# Patient Record
Sex: FEMALE | Race: WHITE | NOT HISPANIC OR LATINO | URBAN - METROPOLITAN AREA
[De-identification: names, ages, dates, MRNs, and addresses within clinical notes are randomized per-mention and may not be internally consistent; named-entity substitution may affect disease eponyms.]

---

## 2020-07-25 ENCOUNTER — TELEPHONE ENCOUNTER (OUTPATIENT)
Dept: URBAN - METROPOLITAN AREA CLINIC 13 | Facility: CLINIC | Age: 65
End: 2020-07-25

## 2020-07-25 RX ORDER — POLYETHYLENE GLYCOL 3350, SODIUM CHLORIDE, SODIUM BICARBONATE AND POTASSIUM CHLORIDE WITH LEMON FLAVOR 420; 11.2; 5.72; 1.48 G/4L; G/4L; G/4L; G/4L
USE AS DIRECTED POWDER, FOR SOLUTION ORAL
Qty: 1 | Refills: 0 | OUTPATIENT
Start: 2014-12-09 | End: 2017-02-01

## 2020-07-25 RX ORDER — AMOXICILLIN AND CLAVULANATE POTASSIUM 875; 125 MG/1; MG/1
TAKE 1 TABLET EVERY 12 HOURS UNTIL GONE TABLET, FILM COATED ORAL
Qty: 20 | Refills: 0 | OUTPATIENT
Start: 2017-02-01 | End: 2017-07-10

## 2020-07-25 RX ORDER — ESOMEPRAZOLE MAGNESIUM 40 MG
TAKE 1 CAPSULE BY MOUTH ONCE DAILY CAPSULE,DELAYED RELEASE (ENTERIC COATED) ORAL
Qty: 90 | Refills: 3 | OUTPATIENT
End: 2017-07-22

## 2020-07-25 RX ORDER — MELOXICAM 15 MG/1
TAKE 1 TABLET DAILY TABLET ORAL
Refills: 0 | OUTPATIENT
End: 2015-02-13

## 2020-07-25 RX ORDER — ATENOLOL 25 MG/1
TAKE 1 TABLET DAILY TABLET ORAL
Refills: 0 | OUTPATIENT
End: 2017-02-01

## 2020-07-26 ENCOUNTER — TELEPHONE ENCOUNTER (OUTPATIENT)
Dept: URBAN - METROPOLITAN AREA CLINIC 13 | Facility: CLINIC | Age: 65
End: 2020-07-26

## 2020-07-26 RX ORDER — INSULIN LISPRO 100 [IU]/ML
USE AS DIRECTED INJECTION, SOLUTION INTRAVENOUS; SUBCUTANEOUS
Refills: 0 | Status: ACTIVE | COMMUNITY

## 2020-07-26 RX ORDER — NITAZOXANIDE 500 MG/1
TAKE 1 TABLET TWICE DAILY TABLET ORAL
Qty: 20 | Refills: 0 | Status: ACTIVE | COMMUNITY
Start: 2017-07-26

## 2020-07-26 RX ORDER — ESOMEPRAZOLE MAGNESIUM 40 MG/1
TAKE 1 CAPSULE ONCE DAILY CAPSULE, DELAYED RELEASE PELLETS ORAL
Refills: 0 | Status: ACTIVE | COMMUNITY
Start: 2017-07-10

## 2020-07-26 RX ORDER — FLUTICASONE PROPIONATE 50 UG/1
SPRAY, METERED NASAL
Qty: 16 | Refills: 0 | Status: ACTIVE | COMMUNITY
Start: 2013-11-19

## 2020-07-26 RX ORDER — ACETAMINOPHEN AND CODEINE PHOSPHATE 300; 30 MG/1; MG/1
TABLET ORAL
Qty: 45 | Refills: 0 | Status: ACTIVE | COMMUNITY
Start: 2013-12-26

## 2020-07-26 RX ORDER — METFORMIN HYDROCHLORIDE 750 MG/1
TAKE 1 TABLET TWICE DAILY TABLET, EXTENDED RELEASE ORAL
Refills: 0 | Status: ACTIVE | COMMUNITY

## 2020-07-26 RX ORDER — LORAZEPAM 0.5 MG/1
TAKE 1 TABLET 3 TIMES DAILY AS NEEDED TABLET ORAL
Refills: 0 | Status: ACTIVE | COMMUNITY

## 2020-07-26 RX ORDER — AZITHROMYCIN DIHYDRATE 250 MG/1
TABLET, FILM COATED ORAL
Qty: 6 | Refills: 0 | Status: ACTIVE | COMMUNITY
Start: 2014-03-12

## 2020-07-26 RX ORDER — CLINDAMYCIN HYDROCHLORIDE 300 MG/1
CAPSULE ORAL
Qty: 20 | Refills: 0 | Status: ACTIVE | COMMUNITY
Start: 2014-10-22

## 2020-07-26 RX ORDER — OLMESARTAN MEDOXOMIL-HYDROCHLOROTHIAZIDE 12.5; 4 MG/1; MG/1
TABLET, FILM COATED ORAL
Qty: 30 | Refills: 0 | Status: ACTIVE | COMMUNITY
Start: 2014-07-22

## 2020-07-26 RX ORDER — MELOXICAM 7.5 MG/1
TABLET ORAL
Qty: 30 | Refills: 0 | Status: ACTIVE | COMMUNITY
Start: 2014-11-13

## 2020-07-26 RX ORDER — POLYETHYLENE GLYCOL 3350 AND ELECTROLYTES WITH LEMON FLAVOR 236; 22.74; 6.74; 5.86; 2.97 G/4L; G/4L; G/4L; G/4L; G/4L
POWDER, FOR SOLUTION ORAL
Qty: 255 | Refills: 0 | Status: ACTIVE | COMMUNITY
Start: 2014-12-09

## 2020-07-26 RX ORDER — CLINDAMYCIN HYDROCHLORIDE 300 MG/1
CAPSULE ORAL
Qty: 40 | Refills: 0 | Status: ACTIVE | COMMUNITY
Start: 2013-12-27

## 2020-07-26 RX ORDER — ICOSAPENT ETHYL 1000 MG/1
TAKE 2 CAPSULE TWICE DAILY CAPSULE ORAL
Refills: 0 | Status: ACTIVE | COMMUNITY

## 2020-07-26 RX ORDER — FLUCONAZOLE 150 MG/1
TABLET ORAL
Qty: 2 | Refills: 0 | Status: ACTIVE | COMMUNITY
Start: 2014-11-01

## 2020-07-26 RX ORDER — INSULIN GLARGINE 100 [IU]/ML
INJECT SUBCUTANEOUSLY AS DIRECTED INJECTION, SOLUTION SUBCUTANEOUS
Refills: 0 | Status: ACTIVE | COMMUNITY

## 2020-07-26 RX ORDER — SERTRALINE HYDROCHLORIDE 112 UG/1
TAKE 1 TABLET DAILY AS DIRECTED TABLET ORAL
Refills: 0 | Status: ACTIVE | COMMUNITY

## 2020-07-26 RX ORDER — ACETAMINOPHEN 325 MG/1
TAKE 1 TO 2 TABLETS EVERY 4 HOURS AS NEEDED TABLET, FILM COATED ORAL
Refills: 0 | Status: ACTIVE | COMMUNITY

## 2020-07-26 RX ORDER — HYDROCODONE BITARTRATE AND ACETAMINOPHEN 5; 325 MG/1; MG/1
TABLET ORAL
Qty: 16 | Refills: 0 | Status: ACTIVE | COMMUNITY
Start: 2014-08-12

## 2020-07-26 RX ORDER — FLUCONAZOLE 150 MG/1
TABLET ORAL
Qty: 1 | Refills: 0 | Status: ACTIVE | COMMUNITY
Start: 2014-01-20

## 2020-07-26 RX ORDER — DICYCLOMINE HYDROCHLORIDE 10 MG/1
TAKE 1 CAPSULE EVERY 6 HOURS PRN ABDOMINAL PAIN CAPSULE ORAL
Qty: 40 | Refills: 1 | Status: ACTIVE | COMMUNITY
Start: 2017-09-20

## 2020-07-26 RX ORDER — AMOXICILLIN 500 MG/1
CAPSULE ORAL
Qty: 28 | Refills: 0 | Status: ACTIVE | COMMUNITY
Start: 2013-12-24

## 2020-07-26 RX ORDER — OLMESARTAN MEDOXOMIL 40 MG/1
TAKE 1 TABLET DAILY TABLET, FILM COATED ORAL
Refills: 0 | Status: ACTIVE | COMMUNITY

## 2020-07-26 RX ORDER — LEVOFLOXACIN 500 MG/1
TABLET, FILM COATED ORAL
Qty: 7 | Refills: 0 | Status: ACTIVE | COMMUNITY
Start: 2014-04-09

## 2020-07-26 RX ORDER — MOXIFLOXACIN HYDROCHLORIDE TABLETS, 400 MG 400 MG/1
TAKE 1 TABLET DAILY TABLET, FILM COATED ORAL
Qty: 14 | Refills: 0 | Status: ACTIVE | COMMUNITY
Start: 2017-07-26

## 2020-07-26 RX ORDER — CIPROFLOXACIN HYDROCHLORIDE 500 MG/1
TABLET, FILM COATED ORAL
Qty: 20 | Refills: 0 | Status: ACTIVE | COMMUNITY
Start: 2014-11-01

## 2020-07-26 RX ORDER — INSULIN GLARGINE 100 [IU]/ML
INJECTION, SOLUTION SUBCUTANEOUS
Qty: 15 | Refills: 0 | Status: ACTIVE | COMMUNITY
Start: 2014-02-04

## 2020-07-26 RX ORDER — SULFAMETHOXAZOLE AND TRIMETHOPRIM 800; 160 MG/1; MG/1
TAKE 1 TABLET TWICE DAILY TABLET ORAL
Qty: 20 | Refills: 0 | Status: ACTIVE | COMMUNITY
Start: 2017-09-20

## 2020-07-26 RX ORDER — AMOXICILLIN AND CLAVULANATE POTASSIUM 875; 125 MG/1; MG/1
TAKE 1 TABLET TWICE DAILY FOR 10 DAYS TABLET, FILM COATED ORAL
Qty: 20 | Refills: 0 | Status: ACTIVE | COMMUNITY
Start: 2017-07-22

## 2020-07-26 RX ORDER — TRAMADOL HYDROCHLORIDE 50 MG/1
TAKE 1 TABLET DAILY PRN TABLET ORAL
Refills: 0 | Status: ACTIVE | COMMUNITY

## 2020-07-26 RX ORDER — ATENOLOL 50 MG/1
TAKE 1 TABLET TWICE DAILY TABLET ORAL
Refills: 0 | Status: ACTIVE | COMMUNITY

## 2020-07-26 RX ORDER — OXYCODONE AND ACETAMINOPHEN 5; 325 MG/1; MG/1
TABLET ORAL
Qty: 10 | Refills: 0 | Status: ACTIVE | COMMUNITY
Start: 2013-12-27

## 2020-07-26 RX ORDER — TRAMADOL HYDROCHLORIDE 50 MG/1
TABLET ORAL
Qty: 60 | Refills: 0 | Status: ACTIVE | COMMUNITY
Start: 2014-08-21

## 2020-07-26 RX ORDER — METRONIDAZOLE 500 MG/1
TABLET ORAL
Qty: 56 | Refills: 0 | Status: ACTIVE | COMMUNITY
Start: 2014-11-02

## 2021-06-10 ENCOUNTER — OFFICE VISIT (OUTPATIENT)
Dept: URBAN - METROPOLITAN AREA CLINIC 107 | Facility: CLINIC | Age: 66
End: 2021-06-10
Payer: MEDICARE

## 2021-06-10 ENCOUNTER — WEB ENCOUNTER (OUTPATIENT)
Dept: URBAN - METROPOLITAN AREA CLINIC 107 | Facility: CLINIC | Age: 66
End: 2021-06-10

## 2021-06-10 VITALS
TEMPERATURE: 98.2 F | HEIGHT: 63 IN | HEART RATE: 74 BPM | SYSTOLIC BLOOD PRESSURE: 157 MMHG | DIASTOLIC BLOOD PRESSURE: 86 MMHG | RESPIRATION RATE: 22 BRPM | WEIGHT: 230 LBS | BODY MASS INDEX: 40.75 KG/M2

## 2021-06-10 DIAGNOSIS — Z87.19 HISTORY OF DIVERTICULITIS OF COLON: ICD-10-CM

## 2021-06-10 DIAGNOSIS — Z12.11 COLON CANCER SCREENING: ICD-10-CM

## 2021-06-10 DIAGNOSIS — K21.9 GASTROESOPHAGEAL REFLUX DISEASE WITHOUT ESOPHAGITIS: ICD-10-CM

## 2021-06-10 PROCEDURE — 99203 OFFICE O/P NEW LOW 30 MIN: CPT | Performed by: INTERNAL MEDICINE

## 2021-06-10 RX ORDER — SERTRALINE HYDROCHLORIDE 112 UG/1
TAKE 1 TABLET DAILY AS DIRECTED TABLET ORAL
Refills: 0 | Status: ACTIVE | COMMUNITY

## 2021-06-10 RX ORDER — INSULIN GLARGINE 100 [IU]/ML
INJECTION, SOLUTION SUBCUTANEOUS
Qty: 15 | Refills: 0 | Status: ON HOLD | COMMUNITY
Start: 2014-02-04

## 2021-06-10 RX ORDER — ICOSAPENT ETHYL 1000 MG/1
TAKE 2 CAPSULE TWICE DAILY CAPSULE ORAL
Refills: 0 | Status: ON HOLD | COMMUNITY

## 2021-06-10 RX ORDER — ONDANSETRON 4 MG/1
1 TABLET ON THE TONGUE AND ALLOW TO DISSOLVE TABLET, ORALLY DISINTEGRATING ORAL ONCE A DAY
Status: ACTIVE | COMMUNITY

## 2021-06-10 RX ORDER — NITAZOXANIDE 500 MG/1
TAKE 1 TABLET TWICE DAILY TABLET ORAL
Qty: 20 | Refills: 0 | Status: ON HOLD | COMMUNITY
Start: 2017-07-26

## 2021-06-10 RX ORDER — OLMESARTAN MEDOXOMIL-HYDROCHLOROTHIAZIDE 12.5; 4 MG/1; MG/1
TABLET, FILM COATED ORAL
Qty: 30 | Refills: 0 | Status: ON HOLD | COMMUNITY
Start: 2014-07-22

## 2021-06-10 RX ORDER — HYDROCHLOROTHIAZIDE 12.5 MG/1
1 CAPSULE IN THE MORNING CAPSULE ORAL ONCE A DAY
Status: ACTIVE | COMMUNITY

## 2021-06-10 RX ORDER — LORAZEPAM 0.5 MG/1
TAKE 1 TABLET 3 TIMES DAILY AS NEEDED TABLET ORAL
Refills: 0 | Status: ACTIVE | COMMUNITY

## 2021-06-10 RX ORDER — TRAMADOL HYDROCHLORIDE 50 MG/1
TAKE 1 TABLET DAILY PRN TABLET ORAL
Refills: 0 | Status: ON HOLD | COMMUNITY

## 2021-06-10 RX ORDER — INSULIN LISPRO 100 [IU]/ML
USE AS DIRECTED INJECTION, SOLUTION INTRAVENOUS; SUBCUTANEOUS
Refills: 0 | Status: ACTIVE | COMMUNITY

## 2021-06-10 RX ORDER — GLUCAGON HYDROCHLORIDE 1 MG/ML
AS DIRECTED INJECTION, POWDER, FOR SOLUTION INTRAMUSCULAR; INTRAVENOUS; SUBCUTANEOUS
Status: ACTIVE | COMMUNITY

## 2021-06-10 RX ORDER — ESOMEPRAZOLE MAGNESIUM 40 MG/1
TAKE 1 CAPSULE ONCE DAILY CAPSULE, DELAYED RELEASE PELLETS ORAL
Refills: 0 | Status: ACTIVE | COMMUNITY
Start: 2017-07-10

## 2021-06-10 RX ORDER — NITROGLYCERIN 0.4 MG/1
AS DIRECTED TABLET SUBLINGUAL
Status: ACTIVE | COMMUNITY

## 2021-06-10 RX ORDER — FLUTICASONE PROPIONATE 50 UG/1
SPRAY, METERED NASAL
Qty: 16 | Refills: 0 | Status: ON HOLD | COMMUNITY
Start: 2013-11-19

## 2021-06-10 RX ORDER — CLOPIDOGREL BISULFATE 75 MG/1
1 TABLET TABLET ORAL ONCE A DAY
Status: ACTIVE | COMMUNITY

## 2021-06-10 RX ORDER — OLMESARTAN MEDOXOMIL 40 MG/1
TAKE 1 TABLET DAILY TABLET, FILM COATED ORAL
Refills: 0 | Status: ACTIVE | COMMUNITY

## 2021-06-10 RX ORDER — CIPROFLOXACIN HYDROCHLORIDE 500 MG/1
TABLET, FILM COATED ORAL
Qty: 20 | Refills: 0 | Status: ON HOLD | COMMUNITY
Start: 2014-11-01

## 2021-06-10 RX ORDER — ACETAMINOPHEN 325 MG/1
TAKE 1 TO 2 TABLETS EVERY 4 HOURS AS NEEDED TABLET, FILM COATED ORAL
Refills: 0 | Status: ON HOLD | COMMUNITY

## 2021-06-10 RX ORDER — POLYETHYLENE GLYCOL 3350 AND ELECTROLYTES WITH LEMON FLAVOR 236; 22.74; 6.74; 5.86; 2.97 G/4L; G/4L; G/4L; G/4L; G/4L
POWDER, FOR SOLUTION ORAL
Qty: 255 | Refills: 0 | Status: ON HOLD | COMMUNITY
Start: 2014-12-09

## 2021-06-10 RX ORDER — HYDROCODONE BITARTRATE AND ACETAMINOPHEN 5; 325 MG/1; MG/1
TABLET ORAL
Qty: 16 | Refills: 0 | Status: ON HOLD | COMMUNITY
Start: 2014-08-12

## 2021-06-10 RX ORDER — OXYCODONE AND ACETAMINOPHEN 5; 325 MG/1; MG/1
TABLET ORAL
Qty: 10 | Refills: 0 | Status: ON HOLD | COMMUNITY
Start: 2013-12-27

## 2021-06-10 RX ORDER — MELOXICAM 7.5 MG/1
TABLET ORAL
Qty: 30 | Refills: 0 | Status: ON HOLD | COMMUNITY
Start: 2014-11-13

## 2021-06-10 RX ORDER — INSULIN GLARGINE 100 [IU]/ML
INJECT SUBCUTANEOUSLY AS DIRECTED INJECTION, SOLUTION SUBCUTANEOUS
Refills: 0 | Status: ON HOLD | COMMUNITY

## 2021-06-10 RX ORDER — AMOXICILLIN 500 MG/1
CAPSULE ORAL
Qty: 28 | Refills: 0 | Status: ON HOLD | COMMUNITY
Start: 2013-12-24

## 2021-06-10 RX ORDER — ATENOLOL 50 MG/1
TAKE 1 TABLET TWICE DAILY TABLET ORAL
Refills: 0 | Status: ACTIVE | COMMUNITY

## 2021-06-10 RX ORDER — AMOXICILLIN AND CLAVULANATE POTASSIUM 875; 125 MG/1; MG/1
TAKE 1 TABLET TWICE DAILY FOR 10 DAYS TABLET, FILM COATED ORAL
Qty: 20 | Refills: 0 | Status: ON HOLD | COMMUNITY
Start: 2017-07-22

## 2021-06-10 RX ORDER — METFORMIN HYDROCHLORIDE 750 MG/1
TAKE 1 TABLET TWICE DAILY TABLET, EXTENDED RELEASE ORAL
Refills: 0 | Status: ON HOLD | COMMUNITY

## 2021-06-10 RX ORDER — LEVOFLOXACIN 500 MG/1
TABLET, FILM COATED ORAL
Qty: 7 | Refills: 0 | Status: ON HOLD | COMMUNITY
Start: 2014-04-09

## 2021-06-10 RX ORDER — SULFAMETHOXAZOLE AND TRIMETHOPRIM 800; 160 MG/1; MG/1
TAKE 1 TABLET TWICE DAILY TABLET ORAL
Qty: 20 | Refills: 0 | Status: ON HOLD | COMMUNITY
Start: 2017-09-20

## 2021-06-10 RX ORDER — ACETAMINOPHEN AND CODEINE PHOSPHATE 300; 30 MG/1; MG/1
TABLET ORAL
Qty: 45 | Refills: 0 | Status: ON HOLD | COMMUNITY
Start: 2013-12-26

## 2021-06-10 RX ORDER — ALBUTEROL SULFATE 90 UG/1
1 PUFF AS NEEDED AEROSOL, METERED RESPIRATORY (INHALATION)
Status: ACTIVE | COMMUNITY

## 2021-06-10 RX ORDER — FLUCONAZOLE 150 MG/1
TABLET ORAL
Qty: 1 | Refills: 0 | Status: ON HOLD | COMMUNITY
Start: 2014-01-20

## 2021-06-10 RX ORDER — METHOCARBAMOL 500 MG/1
1.5 TABLETS TABLET ORAL
Status: ACTIVE | COMMUNITY

## 2021-06-10 RX ORDER — MOXIFLOXACIN HYDROCHLORIDE TABLETS, 400 MG 400 MG/1
TAKE 1 TABLET DAILY TABLET, FILM COATED ORAL
Qty: 14 | Refills: 0 | Status: ON HOLD | COMMUNITY
Start: 2017-07-26

## 2021-06-10 RX ORDER — ESOMEPRAZOLE MAGNESIUM 40 MG/1
TAKE 1 CAPSULE ONCE DAILY CAPSULE, DELAYED RELEASE PELLETS ORAL
OUTPATIENT
Start: 2017-07-10

## 2021-06-10 RX ORDER — METRONIDAZOLE 500 MG/1
TABLET ORAL
Qty: 56 | Refills: 0 | Status: ON HOLD | COMMUNITY
Start: 2014-11-02

## 2021-06-10 RX ORDER — AZITHROMYCIN DIHYDRATE 250 MG/1
TABLET, FILM COATED ORAL
Qty: 6 | Refills: 0 | Status: ON HOLD | COMMUNITY
Start: 2014-03-12

## 2021-06-10 RX ORDER — ROSUVASTATIN CALCIUM 5 MG/1
1 TABLET TABLET, FILM COATED ORAL ONCE A DAY
Status: ACTIVE | COMMUNITY

## 2021-06-10 RX ORDER — CLINDAMYCIN HYDROCHLORIDE 300 MG/1
CAPSULE ORAL
Qty: 40 | Refills: 0 | Status: ON HOLD | COMMUNITY
Start: 2013-12-27

## 2021-06-10 RX ORDER — DICYCLOMINE HYDROCHLORIDE 10 MG/1
TAKE 1 CAPSULE EVERY 6 HOURS PRN ABDOMINAL PAIN CAPSULE ORAL
Qty: 40 | Refills: 1 | Status: ON HOLD | COMMUNITY
Start: 2017-09-20

## 2021-06-10 NOTE — HPI-TODAY'S VISIT:
Ms Velazquez is a 65-year-old woman with a history of type 2 diabetes, obesity, recurrent diverticulitis status post sigmoid colon resection (2017) presenting for routine follow-up. She was last seen in 2017 regarding recurrent bouts of sigmoid colon diverticulitis.  She underwent laparoscopic sigmoid colon resection by Dr. Saavedra on 11/6/2017. She typically has a bowel movement after drinking coffee in the morning.  She reports the stool consistency is like "mush".  She has some increased gas/bloating.  She takes a probiotic without much improvement.  She has experienced 2 episodes of abdominal pain since her colon resection, but otherwise no abdominal pain.  She denies any blood per rectum. She denies any heartburn taking esomeprazole 40 mg daily.  No dysphagia.

## 2021-09-02 ENCOUNTER — WEB ENCOUNTER (OUTPATIENT)
Dept: URBAN - METROPOLITAN AREA CLINIC 107 | Facility: CLINIC | Age: 66
End: 2021-09-02

## 2021-09-02 ENCOUNTER — OFFICE VISIT (OUTPATIENT)
Dept: URBAN - METROPOLITAN AREA CLINIC 107 | Facility: CLINIC | Age: 66
End: 2021-09-02
Payer: MEDICARE

## 2021-09-02 ENCOUNTER — LAB OUTSIDE AN ENCOUNTER (OUTPATIENT)
Dept: URBAN - METROPOLITAN AREA CLINIC 107 | Facility: CLINIC | Age: 66
End: 2021-09-02

## 2021-09-02 VITALS
HEART RATE: 81 BPM | SYSTOLIC BLOOD PRESSURE: 134 MMHG | DIASTOLIC BLOOD PRESSURE: 70 MMHG | WEIGHT: 232 LBS | HEIGHT: 63 IN | TEMPERATURE: 98.8 F | BODY MASS INDEX: 41.11 KG/M2

## 2021-09-02 DIAGNOSIS — K21.9 GASTROESOPHAGEAL REFLUX DISEASE WITHOUT ESOPHAGITIS: ICD-10-CM

## 2021-09-02 DIAGNOSIS — R13.14 PHARYNGOESOPHAGEAL DYSPHAGIA: ICD-10-CM

## 2021-09-02 DIAGNOSIS — K62.5 BRBPR (BRIGHT RED BLOOD PER RECTUM): ICD-10-CM

## 2021-09-02 PROBLEM — 118361000119100: Status: ACTIVE | Noted: 2021-06-10

## 2021-09-02 PROCEDURE — 99214 OFFICE O/P EST MOD 30 MIN: CPT | Performed by: INTERNAL MEDICINE

## 2021-09-02 RX ORDER — FLUCONAZOLE 150 MG/1
TABLET ORAL
Qty: 1 | Refills: 0 | Status: ON HOLD | COMMUNITY
Start: 2014-01-20

## 2021-09-02 RX ORDER — INSULIN LISPRO 100 [IU]/ML
USE AS DIRECTED INJECTION, SOLUTION INTRAVENOUS; SUBCUTANEOUS
Refills: 0 | Status: ON HOLD | COMMUNITY

## 2021-09-02 RX ORDER — TRAMADOL HYDROCHLORIDE 50 MG/1
TAKE 1 TABLET DAILY PRN TABLET ORAL
Refills: 0 | Status: ON HOLD | COMMUNITY

## 2021-09-02 RX ORDER — CLINDAMYCIN HYDROCHLORIDE 300 MG/1
CAPSULE ORAL
Qty: 40 | Refills: 0 | Status: ON HOLD | COMMUNITY
Start: 2013-12-27

## 2021-09-02 RX ORDER — LEVOFLOXACIN 500 MG/1
TABLET, FILM COATED ORAL
Qty: 7 | Refills: 0 | Status: ON HOLD | COMMUNITY
Start: 2014-04-09

## 2021-09-02 RX ORDER — METHOCARBAMOL 500 MG/1
1.5 TABLETS TABLET ORAL
Status: ACTIVE | COMMUNITY

## 2021-09-02 RX ORDER — INSULIN HUMAN 500 [IU]/ML
AS DIRECTED INJECTION, SOLUTION SUBCUTANEOUS
Status: ACTIVE | COMMUNITY

## 2021-09-02 RX ORDER — LORAZEPAM 0.5 MG/1
TAKE 1 TABLET 3 TIMES DAILY AS NEEDED TABLET ORAL
Refills: 0 | Status: ACTIVE | COMMUNITY

## 2021-09-02 RX ORDER — OLMESARTAN MEDOXOMIL 40 MG/1
TAKE 1 TABLET DAILY TABLET, FILM COATED ORAL
Refills: 0 | Status: ACTIVE | COMMUNITY

## 2021-09-02 RX ORDER — MELOXICAM 7.5 MG/1
TABLET ORAL
Qty: 30 | Refills: 0 | Status: ON HOLD | COMMUNITY
Start: 2014-11-13

## 2021-09-02 RX ORDER — ESOMEPRAZOLE MAGNESIUM 40 MG/1
TAKE 1 CAPSULE ONCE DAILY CAPSULE, DELAYED RELEASE PELLETS ORAL
Status: ACTIVE | COMMUNITY
Start: 2017-07-10

## 2021-09-02 RX ORDER — OXYCODONE AND ACETAMINOPHEN 5; 325 MG/1; MG/1
TABLET ORAL
Qty: 10 | Refills: 0 | Status: ON HOLD | COMMUNITY
Start: 2013-12-27

## 2021-09-02 RX ORDER — GLUCAGON HYDROCHLORIDE 1 MG/ML
AS DIRECTED INJECTION, POWDER, FOR SOLUTION INTRAMUSCULAR; INTRAVENOUS; SUBCUTANEOUS
Status: ACTIVE | COMMUNITY

## 2021-09-02 RX ORDER — ALBUTEROL SULFATE 90 UG/1
1 PUFF AS NEEDED AEROSOL, METERED RESPIRATORY (INHALATION)
Status: ACTIVE | COMMUNITY

## 2021-09-02 RX ORDER — METFORMIN HYDROCHLORIDE 750 MG/1
TAKE 1 TABLET TWICE DAILY TABLET, EXTENDED RELEASE ORAL
Refills: 0 | Status: ON HOLD | COMMUNITY

## 2021-09-02 RX ORDER — ROSUVASTATIN CALCIUM 5 MG/1
1 TABLET TABLET, FILM COATED ORAL ONCE A DAY
Status: ACTIVE | COMMUNITY

## 2021-09-02 RX ORDER — SERTRALINE HYDROCHLORIDE 112 UG/1
TAKE 1 TABLET DAILY AS DIRECTED TABLET ORAL
Refills: 0 | Status: ACTIVE | COMMUNITY

## 2021-09-02 RX ORDER — HYDROCODONE BITARTRATE AND ACETAMINOPHEN 5; 325 MG/1; MG/1
TABLET ORAL
Qty: 16 | Refills: 0 | Status: ON HOLD | COMMUNITY
Start: 2014-08-12

## 2021-09-02 RX ORDER — HYDROCHLOROTHIAZIDE 12.5 MG/1
1 CAPSULE IN THE MORNING CAPSULE ORAL ONCE A DAY
Status: ACTIVE | COMMUNITY

## 2021-09-02 RX ORDER — NITAZOXANIDE 500 MG/1
TAKE 1 TABLET TWICE DAILY TABLET ORAL
Qty: 20 | Refills: 0 | Status: ON HOLD | COMMUNITY
Start: 2017-07-26

## 2021-09-02 RX ORDER — ONDANSETRON 4 MG/1
1 TABLET ON THE TONGUE AND ALLOW TO DISSOLVE TABLET, ORALLY DISINTEGRATING ORAL ONCE A DAY
Status: ACTIVE | COMMUNITY

## 2021-09-02 RX ORDER — AMOXICILLIN 500 MG/1
CAPSULE ORAL
Qty: 28 | Refills: 0 | Status: ON HOLD | COMMUNITY
Start: 2013-12-24

## 2021-09-02 RX ORDER — INSULIN GLARGINE 100 [IU]/ML
INJECTION, SOLUTION SUBCUTANEOUS
Qty: 15 | Refills: 0 | Status: ON HOLD | COMMUNITY
Start: 2014-02-04

## 2021-09-02 RX ORDER — OLMESARTAN MEDOXOMIL-HYDROCHLOROTHIAZIDE 12.5; 4 MG/1; MG/1
TABLET, FILM COATED ORAL
Qty: 30 | Refills: 0 | Status: ON HOLD | COMMUNITY
Start: 2014-07-22

## 2021-09-02 RX ORDER — NITROGLYCERIN 0.4 MG/1
AS DIRECTED TABLET SUBLINGUAL
Status: ACTIVE | COMMUNITY

## 2021-09-02 RX ORDER — METRONIDAZOLE 500 MG/1
TABLET ORAL
Qty: 56 | Refills: 0 | Status: ON HOLD | COMMUNITY
Start: 2014-11-02

## 2021-09-02 RX ORDER — ACETAMINOPHEN AND CODEINE PHOSPHATE 300; 30 MG/1; MG/1
TABLET ORAL
Qty: 45 | Refills: 0 | Status: ON HOLD | COMMUNITY
Start: 2013-12-26

## 2021-09-02 RX ORDER — ICOSAPENT ETHYL 1000 MG/1
TAKE 2 CAPSULE TWICE DAILY CAPSULE ORAL
Refills: 0 | Status: ON HOLD | COMMUNITY

## 2021-09-02 RX ORDER — MOXIFLOXACIN HYDROCHLORIDE TABLETS, 400 MG 400 MG/1
TAKE 1 TABLET DAILY TABLET, FILM COATED ORAL
Qty: 14 | Refills: 0 | Status: ON HOLD | COMMUNITY
Start: 2017-07-26

## 2021-09-02 RX ORDER — CLOPIDOGREL BISULFATE 75 MG/1
1 TABLET TABLET ORAL ONCE A DAY
Status: ACTIVE | COMMUNITY

## 2021-09-02 RX ORDER — DICYCLOMINE HYDROCHLORIDE 10 MG/1
TAKE 1 CAPSULE EVERY 6 HOURS PRN ABDOMINAL PAIN CAPSULE ORAL
Qty: 40 | Refills: 1 | Status: ON HOLD | COMMUNITY
Start: 2017-09-20

## 2021-09-02 RX ORDER — CIPROFLOXACIN HYDROCHLORIDE 500 MG/1
TABLET, FILM COATED ORAL
Qty: 20 | Refills: 0 | Status: ON HOLD | COMMUNITY
Start: 2014-11-01

## 2021-09-02 RX ORDER — HYDROXYCHLOROQUINE SULFATE 200 MG/1
TAKE ONE TABLET TABLET, FILM COATED ORAL TWICE DAILY
Status: ACTIVE | COMMUNITY

## 2021-09-02 RX ORDER — INSULIN GLARGINE 100 [IU]/ML
INJECT SUBCUTANEOUSLY AS DIRECTED INJECTION, SOLUTION SUBCUTANEOUS
Refills: 0 | Status: ON HOLD | COMMUNITY

## 2021-09-02 RX ORDER — ACETAMINOPHEN 325 MG/1
TAKE 1 TO 2 TABLETS EVERY 4 HOURS AS NEEDED TABLET, FILM COATED ORAL
Refills: 0 | Status: ON HOLD | COMMUNITY

## 2021-09-02 RX ORDER — AMOXICILLIN AND CLAVULANATE POTASSIUM 875; 125 MG/1; MG/1
TAKE 1 TABLET TWICE DAILY FOR 10 DAYS TABLET, FILM COATED ORAL
Qty: 20 | Refills: 0 | Status: ON HOLD | COMMUNITY
Start: 2017-07-22

## 2021-09-02 RX ORDER — SULFAMETHOXAZOLE AND TRIMETHOPRIM 800; 160 MG/1; MG/1
TAKE 1 TABLET TWICE DAILY TABLET ORAL
Qty: 20 | Refills: 0 | Status: ON HOLD | COMMUNITY
Start: 2017-09-20

## 2021-09-02 RX ORDER — POLYETHYLENE GLYCOL 3350 AND ELECTROLYTES WITH LEMON FLAVOR 236; 22.74; 6.74; 5.86; 2.97 G/4L; G/4L; G/4L; G/4L; G/4L
POWDER, FOR SOLUTION ORAL
Qty: 255 | Refills: 0 | Status: ON HOLD | COMMUNITY
Start: 2014-12-09

## 2021-09-02 RX ORDER — AZITHROMYCIN DIHYDRATE 250 MG/1
TABLET, FILM COATED ORAL
Qty: 6 | Refills: 0 | Status: ON HOLD | COMMUNITY
Start: 2014-03-12

## 2021-09-02 RX ORDER — ATENOLOL 50 MG/1
TAKE 1 TABLET TWICE DAILY TABLET ORAL
Refills: 0 | Status: ACTIVE | COMMUNITY

## 2021-09-02 RX ORDER — FLUTICASONE PROPIONATE 50 UG/1
SPRAY, METERED NASAL
Qty: 16 | Refills: 0 | Status: ON HOLD | COMMUNITY
Start: 2013-11-19

## 2021-09-02 NOTE — HPI-TODAY'S VISIT:
Ms. Velazquez is a 66-year-old woman with a history of diabetes mellitus type 2, obesity, recurrent diverticulitis status post sigmoid colon resection (2017), presenting with multiple complaints of acid reflux, dysphagia and red blood per rectum. She was last seen in this office on 6/10/2021 and was doing relatively well at that time.  Her GERD symptoms were well controlled on esomeprazole 40 mg once daily.  She was to trial Benefiber 1 tablespoon twice daily in an effort to improve stool consistency.  She reports a single episode of bright red blood per rectum after straining to have a bowel movement last week.  This is since resolved.  She attributes this to hemorrhoidal irritation.  Otherwise, she states that she drinks a cup of coffee in the morning to have 1-2 regular bowel movements daily.  She did not trial Benefiber.  She denies any pain with defecation.  Her main complaint today is a new problem of intermittent dysphagia with liquids.  She describes a sensation of liquids becoming hung at the level of her thyroid and at the level of her xiphoid process.  She reports associated regurgitation and a nonproductive cough.  She has occasional nighttime awakenings.  There is no odynophagia. Denies nay difficulty swallowing solid foods or pills.  She does report a history of chronic shortness of breath.  She has not seen a pulmonologist for this recently.  She states that she was placed on Plavix sometime last year by . SHe was being evaluated by cardiology for "heart issues", but work up was postponed secondary to the pandemic.

## 2021-09-17 ENCOUNTER — TELEPHONE ENCOUNTER (OUTPATIENT)
Dept: URBAN - METROPOLITAN AREA CLINIC 113 | Facility: CLINIC | Age: 66
End: 2021-09-17

## 2021-09-27 ENCOUNTER — OFFICE VISIT (OUTPATIENT)
Dept: URBAN - METROPOLITAN AREA SURGERY CENTER 25 | Facility: SURGERY CENTER | Age: 66
End: 2021-09-27

## 2021-10-07 ENCOUNTER — OFFICE VISIT (OUTPATIENT)
Dept: URBAN - METROPOLITAN AREA CLINIC 107 | Facility: CLINIC | Age: 66
End: 2021-10-07

## 2021-11-02 ENCOUNTER — OFFICE VISIT (OUTPATIENT)
Dept: URBAN - METROPOLITAN AREA CLINIC 113 | Facility: CLINIC | Age: 66
End: 2021-11-02

## 2021-12-01 ENCOUNTER — CLAIMS CREATED FROM THE CLAIM WINDOW (OUTPATIENT)
Dept: URBAN - METROPOLITAN AREA CLINIC 4 | Facility: CLINIC | Age: 66
End: 2021-12-01
Payer: MEDICARE

## 2021-12-01 ENCOUNTER — OFFICE VISIT (OUTPATIENT)
Dept: URBAN - METROPOLITAN AREA SURGERY CENTER 25 | Facility: SURGERY CENTER | Age: 66
End: 2021-12-01
Payer: MEDICARE

## 2021-12-01 DIAGNOSIS — K31.89 GASTRIC FOVEOLAR HYPERPLASIA: ICD-10-CM

## 2021-12-01 DIAGNOSIS — K21.9 GASTROESOPHAGEAL REFLUX DISEASE: ICD-10-CM

## 2021-12-01 DIAGNOSIS — K31.7 POLYP OF STOMACH FUNDIC GLAND: ICD-10-CM

## 2021-12-01 DIAGNOSIS — R13.10 DYSPHAGIA: ICD-10-CM

## 2021-12-01 DIAGNOSIS — K31.7 POLYP OF STOMACH AND DUODENUM: ICD-10-CM

## 2021-12-01 DIAGNOSIS — K31.89 DEFORMED PYLORUS, ACQUIRED: ICD-10-CM

## 2021-12-01 PROCEDURE — 88312 SPECIAL STAINS GROUP 1: CPT | Performed by: PATHOLOGY

## 2021-12-01 PROCEDURE — G8907 PT DOC NO EVENTS ON DISCHARG: HCPCS | Performed by: INTERNAL MEDICINE

## 2021-12-01 PROCEDURE — 88305 TISSUE EXAM BY PATHOLOGIST: CPT | Performed by: PATHOLOGY

## 2021-12-01 PROCEDURE — 43239 EGD BIOPSY SINGLE/MULTIPLE: CPT | Performed by: INTERNAL MEDICINE

## 2021-12-01 PROCEDURE — 43248 EGD GUIDE WIRE INSERTION: CPT | Performed by: INTERNAL MEDICINE

## 2021-12-01 RX ORDER — OXYCODONE AND ACETAMINOPHEN 5; 325 MG/1; MG/1
TABLET ORAL
Qty: 10 | Refills: 0 | Status: ON HOLD | COMMUNITY
Start: 2013-12-27

## 2021-12-01 RX ORDER — SULFAMETHOXAZOLE AND TRIMETHOPRIM 800; 160 MG/1; MG/1
TAKE 1 TABLET TWICE DAILY TABLET ORAL
Qty: 20 | Refills: 0 | Status: ON HOLD | COMMUNITY
Start: 2017-09-20

## 2021-12-01 RX ORDER — ROSUVASTATIN CALCIUM 5 MG/1
1 TABLET TABLET, FILM COATED ORAL ONCE A DAY
Status: ACTIVE | COMMUNITY

## 2021-12-01 RX ORDER — AMOXICILLIN 500 MG/1
CAPSULE ORAL
Qty: 28 | Refills: 0 | Status: ON HOLD | COMMUNITY
Start: 2013-12-24

## 2021-12-01 RX ORDER — METFORMIN HYDROCHLORIDE 750 MG/1
TAKE 1 TABLET TWICE DAILY TABLET, EXTENDED RELEASE ORAL
Refills: 0 | Status: ON HOLD | COMMUNITY

## 2021-12-01 RX ORDER — MELOXICAM 7.5 MG/1
TABLET ORAL
Qty: 30 | Refills: 0 | Status: ON HOLD | COMMUNITY
Start: 2014-11-13

## 2021-12-01 RX ORDER — INSULIN GLARGINE 100 [IU]/ML
INJECT SUBCUTANEOUSLY AS DIRECTED INJECTION, SOLUTION SUBCUTANEOUS
Refills: 0 | Status: ON HOLD | COMMUNITY

## 2021-12-01 RX ORDER — INSULIN HUMAN 500 [IU]/ML
AS DIRECTED INJECTION, SOLUTION SUBCUTANEOUS
Status: ACTIVE | COMMUNITY

## 2021-12-01 RX ORDER — SERTRALINE HYDROCHLORIDE 112 UG/1
TAKE 1 TABLET DAILY AS DIRECTED TABLET ORAL
Refills: 0 | Status: ACTIVE | COMMUNITY

## 2021-12-01 RX ORDER — METHOCARBAMOL 500 MG/1
1.5 TABLETS TABLET ORAL
Status: ACTIVE | COMMUNITY

## 2021-12-01 RX ORDER — METRONIDAZOLE 500 MG/1
TABLET ORAL
Qty: 56 | Refills: 0 | Status: ON HOLD | COMMUNITY
Start: 2014-11-02

## 2021-12-01 RX ORDER — LEVOFLOXACIN 500 MG/1
TABLET, FILM COATED ORAL
Qty: 7 | Refills: 0 | Status: ON HOLD | COMMUNITY
Start: 2014-04-09

## 2021-12-01 RX ORDER — ESOMEPRAZOLE MAGNESIUM 40 MG/1
TAKE 1 CAPSULE ONCE DAILY CAPSULE, DELAYED RELEASE PELLETS ORAL
Status: ACTIVE | COMMUNITY
Start: 2017-07-10

## 2021-12-01 RX ORDER — AMOXICILLIN AND CLAVULANATE POTASSIUM 875; 125 MG/1; MG/1
TAKE 1 TABLET TWICE DAILY FOR 10 DAYS TABLET, FILM COATED ORAL
Qty: 20 | Refills: 0 | Status: ON HOLD | COMMUNITY
Start: 2017-07-22

## 2021-12-01 RX ORDER — FLUTICASONE PROPIONATE 50 UG/1
SPRAY, METERED NASAL
Qty: 16 | Refills: 0 | Status: ON HOLD | COMMUNITY
Start: 2013-11-19

## 2021-12-01 RX ORDER — AZITHROMYCIN DIHYDRATE 250 MG/1
TABLET, FILM COATED ORAL
Qty: 6 | Refills: 0 | Status: ON HOLD | COMMUNITY
Start: 2014-03-12

## 2021-12-01 RX ORDER — INSULIN LISPRO 100 [IU]/ML
USE AS DIRECTED INJECTION, SOLUTION INTRAVENOUS; SUBCUTANEOUS
Refills: 0 | Status: ON HOLD | COMMUNITY

## 2021-12-01 RX ORDER — INSULIN GLARGINE 100 [IU]/ML
INJECTION, SOLUTION SUBCUTANEOUS
Qty: 15 | Refills: 0 | Status: ON HOLD | COMMUNITY
Start: 2014-02-04

## 2021-12-01 RX ORDER — LORAZEPAM 0.5 MG/1
TAKE 1 TABLET 3 TIMES DAILY AS NEEDED TABLET ORAL
Refills: 0 | Status: ACTIVE | COMMUNITY

## 2021-12-01 RX ORDER — NITROGLYCERIN 0.4 MG/1
AS DIRECTED TABLET SUBLINGUAL
Status: ACTIVE | COMMUNITY

## 2021-12-01 RX ORDER — HYDROXYCHLOROQUINE SULFATE 200 MG/1
TAKE ONE TABLET TABLET, FILM COATED ORAL TWICE DAILY
Status: ACTIVE | COMMUNITY

## 2021-12-01 RX ORDER — CLINDAMYCIN HYDROCHLORIDE 300 MG/1
CAPSULE ORAL
Qty: 40 | Refills: 0 | Status: ON HOLD | COMMUNITY
Start: 2013-12-27

## 2021-12-01 RX ORDER — OLMESARTAN MEDOXOMIL 40 MG/1
TAKE 1 TABLET DAILY TABLET, FILM COATED ORAL
Refills: 0 | Status: ACTIVE | COMMUNITY

## 2021-12-01 RX ORDER — HYDROCHLOROTHIAZIDE 12.5 MG/1
1 CAPSULE IN THE MORNING CAPSULE ORAL ONCE A DAY
Status: ACTIVE | COMMUNITY

## 2021-12-01 RX ORDER — CLOPIDOGREL BISULFATE 75 MG/1
1 TABLET TABLET ORAL ONCE A DAY
Status: ACTIVE | COMMUNITY

## 2021-12-01 RX ORDER — ALBUTEROL SULFATE 90 UG/1
1 PUFF AS NEEDED AEROSOL, METERED RESPIRATORY (INHALATION)
Status: ACTIVE | COMMUNITY

## 2021-12-01 RX ORDER — ACETAMINOPHEN 325 MG/1
TAKE 1 TO 2 TABLETS EVERY 4 HOURS AS NEEDED TABLET, FILM COATED ORAL
Refills: 0 | Status: ON HOLD | COMMUNITY

## 2021-12-01 RX ORDER — DICYCLOMINE HYDROCHLORIDE 10 MG/1
TAKE 1 CAPSULE EVERY 6 HOURS PRN ABDOMINAL PAIN CAPSULE ORAL
Qty: 40 | Refills: 1 | Status: ON HOLD | COMMUNITY
Start: 2017-09-20

## 2021-12-01 RX ORDER — ICOSAPENT ETHYL 1000 MG/1
TAKE 2 CAPSULE TWICE DAILY CAPSULE ORAL
Refills: 0 | Status: ON HOLD | COMMUNITY

## 2021-12-01 RX ORDER — ONDANSETRON 4 MG/1
1 TABLET ON THE TONGUE AND ALLOW TO DISSOLVE TABLET, ORALLY DISINTEGRATING ORAL ONCE A DAY
Status: ACTIVE | COMMUNITY

## 2021-12-01 RX ORDER — POLYETHYLENE GLYCOL 3350 AND ELECTROLYTES WITH LEMON FLAVOR 236; 22.74; 6.74; 5.86; 2.97 G/4L; G/4L; G/4L; G/4L; G/4L
POWDER, FOR SOLUTION ORAL
Qty: 255 | Refills: 0 | Status: ON HOLD | COMMUNITY
Start: 2014-12-09

## 2021-12-01 RX ORDER — ATENOLOL 50 MG/1
TAKE 1 TABLET TWICE DAILY TABLET ORAL
Refills: 0 | Status: ACTIVE | COMMUNITY

## 2021-12-01 RX ORDER — MOXIFLOXACIN HYDROCHLORIDE TABLETS, 400 MG 400 MG/1
TAKE 1 TABLET DAILY TABLET, FILM COATED ORAL
Qty: 14 | Refills: 0 | Status: ON HOLD | COMMUNITY
Start: 2017-07-26

## 2021-12-01 RX ORDER — NITAZOXANIDE 500 MG/1
TAKE 1 TABLET TWICE DAILY TABLET ORAL
Qty: 20 | Refills: 0 | Status: ON HOLD | COMMUNITY
Start: 2017-07-26

## 2021-12-01 RX ORDER — CIPROFLOXACIN HYDROCHLORIDE 500 MG/1
TABLET, FILM COATED ORAL
Qty: 20 | Refills: 0 | Status: ON HOLD | COMMUNITY
Start: 2014-11-01

## 2021-12-01 RX ORDER — OLMESARTAN MEDOXOMIL-HYDROCHLOROTHIAZIDE 12.5; 4 MG/1; MG/1
TABLET, FILM COATED ORAL
Qty: 30 | Refills: 0 | Status: ON HOLD | COMMUNITY
Start: 2014-07-22

## 2021-12-01 RX ORDER — HYDROCODONE BITARTRATE AND ACETAMINOPHEN 5; 325 MG/1; MG/1
TABLET ORAL
Qty: 16 | Refills: 0 | Status: ON HOLD | COMMUNITY
Start: 2014-08-12

## 2021-12-01 RX ORDER — TRAMADOL HYDROCHLORIDE 50 MG/1
TAKE 1 TABLET DAILY PRN TABLET ORAL
Refills: 0 | Status: ON HOLD | COMMUNITY

## 2021-12-01 RX ORDER — ACETAMINOPHEN AND CODEINE PHOSPHATE 300; 30 MG/1; MG/1
TABLET ORAL
Qty: 45 | Refills: 0 | Status: ON HOLD | COMMUNITY
Start: 2013-12-26

## 2021-12-01 RX ORDER — FLUCONAZOLE 150 MG/1
TABLET ORAL
Qty: 1 | Refills: 0 | Status: ON HOLD | COMMUNITY
Start: 2014-01-20

## 2021-12-01 RX ORDER — GLUCAGON HYDROCHLORIDE 1 MG/ML
AS DIRECTED INJECTION, POWDER, FOR SOLUTION INTRAMUSCULAR; INTRAVENOUS; SUBCUTANEOUS
Status: ACTIVE | COMMUNITY

## 2022-01-21 ENCOUNTER — OFFICE VISIT (OUTPATIENT)
Dept: URBAN - METROPOLITAN AREA CLINIC 113 | Facility: CLINIC | Age: 67
End: 2022-01-21

## 2022-02-07 ENCOUNTER — OFFICE VISIT (OUTPATIENT)
Dept: URBAN - METROPOLITAN AREA CLINIC 113 | Facility: CLINIC | Age: 67
End: 2022-02-07
Payer: MEDICARE

## 2022-02-07 ENCOUNTER — WEB ENCOUNTER (OUTPATIENT)
Dept: URBAN - METROPOLITAN AREA CLINIC 113 | Facility: CLINIC | Age: 67
End: 2022-02-07

## 2022-02-07 VITALS
WEIGHT: 237 LBS | HEIGHT: 63 IN | TEMPERATURE: 98.1 F | SYSTOLIC BLOOD PRESSURE: 168 MMHG | BODY MASS INDEX: 41.99 KG/M2 | HEART RATE: 67 BPM | DIASTOLIC BLOOD PRESSURE: 84 MMHG | RESPIRATION RATE: 18 BRPM

## 2022-02-07 DIAGNOSIS — K62.5 BRBPR (BRIGHT RED BLOOD PER RECTUM): ICD-10-CM

## 2022-02-07 DIAGNOSIS — K21.9 GASTROESOPHAGEAL REFLUX DISEASE WITHOUT ESOPHAGITIS: ICD-10-CM

## 2022-02-07 DIAGNOSIS — R79.89 ELEVATED LFTS: ICD-10-CM

## 2022-02-07 DIAGNOSIS — R13.14 PHARYNGOESOPHAGEAL DYSPHAGIA: ICD-10-CM

## 2022-02-07 DIAGNOSIS — Z12.11 COLON CANCER SCREENING: ICD-10-CM

## 2022-02-07 PROCEDURE — 99214 OFFICE O/P EST MOD 30 MIN: CPT | Performed by: PHYSICIAN ASSISTANT

## 2022-02-07 RX ORDER — METRONIDAZOLE 500 MG/1
TABLET ORAL
Qty: 56 | Refills: 0 | Status: DISCONTINUED | COMMUNITY
Start: 2014-11-02

## 2022-02-07 RX ORDER — DICYCLOMINE HYDROCHLORIDE 10 MG/1
TAKE 1 CAPSULE EVERY 6 HOURS PRN ABDOMINAL PAIN CAPSULE ORAL
Qty: 40 | Refills: 1 | Status: DISCONTINUED | COMMUNITY
Start: 2017-09-20

## 2022-02-07 RX ORDER — ALBUTEROL SULFATE 90 UG/1
1 PUFF AS NEEDED AEROSOL, METERED RESPIRATORY (INHALATION)
Status: ACTIVE | COMMUNITY

## 2022-02-07 RX ORDER — INSULIN GLARGINE 100 [IU]/ML
INJECT SUBCUTANEOUSLY AS DIRECTED INJECTION, SOLUTION SUBCUTANEOUS
Refills: 0 | Status: DISCONTINUED | COMMUNITY

## 2022-02-07 RX ORDER — MOXIFLOXACIN HYDROCHLORIDE TABLETS, 400 MG 400 MG/1
TAKE 1 TABLET DAILY TABLET, FILM COATED ORAL
Qty: 14 | Refills: 0 | Status: DISCONTINUED | COMMUNITY
Start: 2017-07-26

## 2022-02-07 RX ORDER — CLOPIDOGREL BISULFATE 75 MG/1
1 TABLET TABLET ORAL ONCE A DAY
Status: ACTIVE | COMMUNITY

## 2022-02-07 RX ORDER — LORAZEPAM 0.5 MG/1
TAKE 1 TABLET 3 TIMES DAILY AS NEEDED TABLET ORAL
Refills: 0 | Status: ACTIVE | COMMUNITY

## 2022-02-07 RX ORDER — ICOSAPENT ETHYL 1000 MG/1
TAKE 2 CAPSULE TWICE DAILY CAPSULE ORAL
Refills: 0 | Status: DISCONTINUED | COMMUNITY

## 2022-02-07 RX ORDER — NITAZOXANIDE 500 MG/1
TAKE 1 TABLET TWICE DAILY TABLET ORAL
Qty: 20 | Refills: 0 | Status: DISCONTINUED | COMMUNITY
Start: 2017-07-26

## 2022-02-07 RX ORDER — NITROGLYCERIN 0.4 MG/1
AS DIRECTED TABLET SUBLINGUAL
Status: ACTIVE | COMMUNITY

## 2022-02-07 RX ORDER — ATENOLOL 50 MG/1
TAKE 1 TABLET TWICE DAILY TABLET ORAL
Refills: 0 | Status: ACTIVE | COMMUNITY

## 2022-02-07 RX ORDER — AMOXICILLIN 500 MG/1
CAPSULE ORAL
Qty: 28 | Refills: 0 | Status: DISCONTINUED | COMMUNITY
Start: 2013-12-24

## 2022-02-07 RX ORDER — GLUCAGON HYDROCHLORIDE 1 MG/ML
AS DIRECTED INJECTION, POWDER, FOR SOLUTION INTRAMUSCULAR; INTRAVENOUS; SUBCUTANEOUS
Status: ACTIVE | COMMUNITY

## 2022-02-07 RX ORDER — SERTRALINE HYDROCHLORIDE 112 UG/1
TAKE 1 TABLET DAILY AS DIRECTED TABLET ORAL
Refills: 0 | Status: ACTIVE | COMMUNITY

## 2022-02-07 RX ORDER — HYDROCHLOROTHIAZIDE 12.5 MG/1
1 CAPSULE IN THE MORNING CAPSULE ORAL ONCE A DAY
Status: ACTIVE | COMMUNITY

## 2022-02-07 RX ORDER — AZITHROMYCIN DIHYDRATE 250 MG/1
TABLET, FILM COATED ORAL
Qty: 6 | Refills: 0 | Status: DISCONTINUED | COMMUNITY
Start: 2014-03-12

## 2022-02-07 RX ORDER — SULFAMETHOXAZOLE AND TRIMETHOPRIM 800; 160 MG/1; MG/1
TAKE 1 TABLET TWICE DAILY TABLET ORAL
Qty: 20 | Refills: 0 | Status: DISCONTINUED | COMMUNITY
Start: 2017-09-20

## 2022-02-07 RX ORDER — METHOCARBAMOL 500 MG/1
1.5 TABLETS TABLET ORAL
Status: DISCONTINUED | COMMUNITY

## 2022-02-07 RX ORDER — ACETAMINOPHEN 325 MG/1
TAKE 1 TO 2 TABLETS EVERY 4 HOURS AS NEEDED TABLET, FILM COATED ORAL
Refills: 0 | Status: DISCONTINUED | COMMUNITY

## 2022-02-07 RX ORDER — CIPROFLOXACIN HYDROCHLORIDE 500 MG/1
TABLET, FILM COATED ORAL
Qty: 20 | Refills: 0 | Status: DISCONTINUED | COMMUNITY
Start: 2014-11-01

## 2022-02-07 RX ORDER — ROSUVASTATIN CALCIUM 5 MG/1
1 TABLET TABLET, FILM COATED ORAL ONCE A DAY
Status: ACTIVE | COMMUNITY

## 2022-02-07 RX ORDER — HYDROCODONE BITARTRATE AND ACETAMINOPHEN 5; 325 MG/1; MG/1
TABLET ORAL
Qty: 16 | Refills: 0 | Status: DISCONTINUED | COMMUNITY
Start: 2014-08-12

## 2022-02-07 RX ORDER — ONDANSETRON 4 MG/1
1 TABLET ON THE TONGUE AND ALLOW TO DISSOLVE TABLET, ORALLY DISINTEGRATING ORAL ONCE A DAY
Status: ACTIVE | COMMUNITY

## 2022-02-07 RX ORDER — INSULIN LISPRO 100 [IU]/ML
USE AS DIRECTED INJECTION, SOLUTION INTRAVENOUS; SUBCUTANEOUS
Refills: 0 | Status: DISCONTINUED | COMMUNITY

## 2022-02-07 RX ORDER — MELOXICAM 7.5 MG/1
TABLET ORAL
Qty: 30 | Refills: 0 | Status: DISCONTINUED | COMMUNITY
Start: 2014-11-13

## 2022-02-07 RX ORDER — METFORMIN HYDROCHLORIDE 750 MG/1
TAKE 1 TABLET TWICE DAILY TABLET, EXTENDED RELEASE ORAL
Refills: 0 | Status: DISCONTINUED | COMMUNITY

## 2022-02-07 RX ORDER — ACETAMINOPHEN AND CODEINE PHOSPHATE 300; 30 MG/1; MG/1
TABLET ORAL
Qty: 45 | Refills: 0 | Status: DISCONTINUED | COMMUNITY
Start: 2013-12-26

## 2022-02-07 RX ORDER — OLMESARTAN MEDOXOMIL 40 MG/1
TAKE 1 TABLET DAILY TABLET, FILM COATED ORAL
Refills: 0 | Status: ACTIVE | COMMUNITY

## 2022-02-07 RX ORDER — OXYCODONE AND ACETAMINOPHEN 5; 325 MG/1; MG/1
TABLET ORAL
Qty: 10 | Refills: 0 | Status: DISCONTINUED | COMMUNITY
Start: 2013-12-27

## 2022-02-07 RX ORDER — TRAMADOL HYDROCHLORIDE 50 MG/1
TAKE 1 TABLET DAILY PRN TABLET ORAL
Refills: 0 | Status: DISCONTINUED | COMMUNITY

## 2022-02-07 RX ORDER — INSULIN GLARGINE 100 [IU]/ML
INJECTION, SOLUTION SUBCUTANEOUS
Qty: 15 | Refills: 0 | Status: DISCONTINUED | COMMUNITY
Start: 2014-02-04

## 2022-02-07 RX ORDER — INSULIN HUMAN 500 [IU]/ML
AS DIRECTED INJECTION, SOLUTION SUBCUTANEOUS
Status: ACTIVE | COMMUNITY

## 2022-02-07 RX ORDER — OLMESARTAN MEDOXOMIL-HYDROCHLOROTHIAZIDE 12.5; 4 MG/1; MG/1
TABLET, FILM COATED ORAL
Qty: 30 | Refills: 0 | Status: DISCONTINUED | COMMUNITY
Start: 2014-07-22

## 2022-02-07 RX ORDER — HYDROXYCHLOROQUINE SULFATE 200 MG/1
TAKE ONE TABLET TABLET, FILM COATED ORAL TWICE DAILY
Status: ACTIVE | COMMUNITY

## 2022-02-07 RX ORDER — FLUTICASONE PROPIONATE 50 UG/1
SPRAY, METERED NASAL
Qty: 16 | Refills: 0 | Status: DISCONTINUED | COMMUNITY
Start: 2013-11-19

## 2022-02-07 RX ORDER — LEVOFLOXACIN 500 MG/1
TABLET, FILM COATED ORAL
Qty: 7 | Refills: 0 | Status: DISCONTINUED | COMMUNITY
Start: 2014-04-09

## 2022-02-07 RX ORDER — ESOMEPRAZOLE MAGNESIUM 40 MG/1
TAKE 1 CAPSULE ONCE DAILY CAPSULE, DELAYED RELEASE PELLETS ORAL
Status: ACTIVE | COMMUNITY
Start: 2017-07-10

## 2022-02-07 RX ORDER — POLYETHYLENE GLYCOL 3350 AND ELECTROLYTES WITH LEMON FLAVOR 236; 22.74; 6.74; 5.86; 2.97 G/4L; G/4L; G/4L; G/4L; G/4L
POWDER, FOR SOLUTION ORAL
Qty: 255 | Refills: 0 | Status: DISCONTINUED | COMMUNITY
Start: 2014-12-09

## 2022-02-07 RX ORDER — CLINDAMYCIN HYDROCHLORIDE 300 MG/1
CAPSULE ORAL
Qty: 40 | Refills: 0 | Status: DISCONTINUED | COMMUNITY
Start: 2013-12-27

## 2022-02-07 RX ORDER — AMOXICILLIN AND CLAVULANATE POTASSIUM 875; 125 MG/1; MG/1
TAKE 1 TABLET TWICE DAILY FOR 10 DAYS TABLET, FILM COATED ORAL
Qty: 20 | Refills: 0 | Status: DISCONTINUED | COMMUNITY
Start: 2017-07-22

## 2022-02-07 RX ORDER — FLUCONAZOLE 150 MG/1
TABLET ORAL
Qty: 1 | Refills: 0 | Status: DISCONTINUED | COMMUNITY
Start: 2014-01-20

## 2022-02-07 NOTE — HPI-TODAY'S VISIT:
Ms. Velazquez is a 66-year-old woman with a history of diabetes mellitus type 2, obesity, recurrent diverticulitis status post sigmoid resection (2017), presenting for follow-up after undergoing EGD. She was last seen on 9/2/2021 with multiple GI complaints including acid reflux, dysphagia and red blood per rectum.  She was recommended to undergo barium swallow, however patient preferred to proceed with endoscopic evaluation at that time.  EGD with dilation was ordered to assess for significant gastroesophageal pathology and for hopeful symptomatic relief in her dysphagia.  Given her chronic shortness of breath of unknown etiology, clearance was obtained from her cardiologist.  She was to continue Plavix prior to procedure.  Her red blood per rectum most likely related to benign anorectal disease such as hemorrhoids.  Her symptoms were noted to have resolved at the time of follow-up.  She was to continue using Tucks pads as needed for hemorrhoidal irritation.  She was instructed to retrial Benefiber 1 to 2 tablespoons daily for hopeful improvements in bowel consistency.  Her GERD symptoms were relatively well controlled on Nexium daily.  She was instructed to use Pepcid 20 mg at bedtime to maximize acid suppression. EGD with dilation (12/1/2021):No endoscopic esophageal abnormality to explain patient's dysphagia.  Empirically dilated with 18 mm Savary dilator.  Nonerosive gastropathy.  Multiple gastric polyps.  Suspect fundic gland polyps.  Normal duodenum.  Stomach antrum and body biopsies revealed proton pump inhibitor effect consistent with fundic gland polyps.  Gastric biopsies revealed polypoid foveolar hyperplasia.  Fundic gland polyp.  No evidence of H. pylori or intestinal metaplasia, dysplasia or malignancy.   Her interval history is notable for COVID-19 infection in January. She did not require hospitalization, supplemental oxygen therapy or ventilatory support at that time. She continues to report occasional episodes of difficulty swallowing, typically occurring with liquids. She denies any regurgitation or odynophagia. No solid food dysphagia. She denies any nausea, vomiting or nocturnal acid reflux symptoms. She has occasional mild generalized abdominal pain. She is not bothered by this stating that her pain is typically fleeting. She is having fairly regular bowel movements every other day. If she consumes dairy products, she reports diarrhea. No red blood per rectum, melena or nocturnal diarrhea. She denies any pain with defecation. Her weight remains stable. Her most recent laboratory testing available to us was performed in August 2021. This revealed mildly elevated ALT at 44 and AST of 62. Her bilirubin was normal at 0.2. She also had a normal alkaline phosphatase at 88. She tells me that she is due for labs with her primary care provider later this month and will have these forwarded to our office. She recently had an abdominal ultrasound by Dr. Reynolds which was reportedly normal. Her database is incomplete as we do not have this record for review. She tells me that her blood sugars are running higher than normal despite adhering to strict insulin regimen. She has plans to see an endocrinologist within the near future. She

## 2022-06-21 ENCOUNTER — OFFICE VISIT (OUTPATIENT)
Dept: URBAN - METROPOLITAN AREA CLINIC 113 | Facility: CLINIC | Age: 67
End: 2022-06-21
Payer: MEDICARE

## 2022-06-21 VITALS
HEIGHT: 63 IN | HEART RATE: 62 BPM | TEMPERATURE: 97.3 F | SYSTOLIC BLOOD PRESSURE: 163 MMHG | RESPIRATION RATE: 18 BRPM | BODY MASS INDEX: 42.88 KG/M2 | DIASTOLIC BLOOD PRESSURE: 87 MMHG | WEIGHT: 242 LBS

## 2022-06-21 DIAGNOSIS — K21.9 GASTROESOPHAGEAL REFLUX DISEASE WITHOUT ESOPHAGITIS: ICD-10-CM

## 2022-06-21 DIAGNOSIS — Z12.11 COLON CANCER SCREENING: ICD-10-CM

## 2022-06-21 DIAGNOSIS — R13.14 PHARYNGOESOPHAGEAL DYSPHAGIA: ICD-10-CM

## 2022-06-21 DIAGNOSIS — R79.89 ELEVATED LFTS: ICD-10-CM

## 2022-06-21 DIAGNOSIS — K62.5 BRBPR (BRIGHT RED BLOOD PER RECTUM): ICD-10-CM

## 2022-06-21 PROBLEM — 305058001: Status: ACTIVE | Noted: 2021-06-10

## 2022-06-21 PROBLEM — 74474003: Status: ACTIVE | Noted: 2021-09-02

## 2022-06-21 PROBLEM — 40739000: Status: ACTIVE | Noted: 2021-09-02

## 2022-06-21 PROCEDURE — 99213 OFFICE O/P EST LOW 20 MIN: CPT

## 2022-06-21 RX ORDER — SERTRALINE HYDROCHLORIDE 112 UG/1
TAKE 1 TABLET DAILY AS DIRECTED TABLET ORAL
Refills: 0 | Status: ACTIVE | COMMUNITY

## 2022-06-21 RX ORDER — ONDANSETRON 4 MG/1
1 TABLET ON THE TONGUE AND ALLOW TO DISSOLVE TABLET, ORALLY DISINTEGRATING ORAL ONCE A DAY
Status: ACTIVE | COMMUNITY

## 2022-06-21 RX ORDER — ALBUTEROL SULFATE 90 UG/1
1 PUFF AS NEEDED AEROSOL, METERED RESPIRATORY (INHALATION)
Status: ACTIVE | COMMUNITY

## 2022-06-21 RX ORDER — ESOMEPRAZOLE MAGNESIUM 40 MG/1
TAKE 1 CAPSULE ONCE DAILY CAPSULE, DELAYED RELEASE PELLETS ORAL
Status: ACTIVE | COMMUNITY
Start: 2017-07-10

## 2022-06-21 RX ORDER — OLMESARTAN MEDOXOMIL 40 MG/1
TAKE 1 TABLET DAILY TABLET, FILM COATED ORAL
Refills: 0 | Status: ACTIVE | COMMUNITY

## 2022-06-21 RX ORDER — HYDROCHLOROTHIAZIDE 12.5 MG/1
1 CAPSULE IN THE MORNING CAPSULE ORAL ONCE A DAY
Status: ON HOLD | COMMUNITY

## 2022-06-21 RX ORDER — ATENOLOL 50 MG/1
TAKE 1 TABLET TWICE DAILY TABLET ORAL
Refills: 0 | Status: ACTIVE | COMMUNITY

## 2022-06-21 RX ORDER — ROSUVASTATIN CALCIUM 5 MG/1
1 TABLET TABLET, FILM COATED ORAL ONCE A DAY
Status: ACTIVE | COMMUNITY

## 2022-06-21 RX ORDER — HYDROXYCHLOROQUINE SULFATE 200 MG/1
TAKE ONE TABLET TABLET, FILM COATED ORAL TWICE DAILY
Status: ACTIVE | COMMUNITY

## 2022-06-21 RX ORDER — INSULIN HUMAN 500 [IU]/ML
AS DIRECTED INJECTION, SOLUTION SUBCUTANEOUS
Status: ACTIVE | COMMUNITY

## 2022-06-21 RX ORDER — INSULIN DEGLUDEC INJECTION 100 U/ML
20 UTS INJECTION, SOLUTION SUBCUTANEOUS
Status: ACTIVE | COMMUNITY

## 2022-06-21 RX ORDER — LORAZEPAM 0.5 MG/1
TAKE 1 TABLET 3 TIMES DAILY AS NEEDED TABLET ORAL AS NEEDED
Refills: 0 | Status: ACTIVE | COMMUNITY

## 2022-06-21 RX ORDER — GLUCAGON HYDROCHLORIDE 1 MG/ML
AS DIRECTED INJECTION, POWDER, FOR SOLUTION INTRAMUSCULAR; INTRAVENOUS; SUBCUTANEOUS
Status: ACTIVE | COMMUNITY

## 2022-06-21 RX ORDER — CLOPIDOGREL BISULFATE 75 MG/1
1 TABLET TABLET ORAL ONCE A DAY
Status: ACTIVE | COMMUNITY

## 2022-06-21 RX ORDER — NITROGLYCERIN 0.4 MG/1
AS DIRECTED TABLET SUBLINGUAL
Status: ACTIVE | COMMUNITY

## 2022-06-21 NOTE — HPI-OTHER HISTORIES
EGD with dilation (12/1/2021):No endoscopic esophageal abnormality to explain patient's dysphagia.  Empirically dilated with 18 mm Savary dilator.  Nonerosive gastropathy.  Multiple gastric polyps.  Suspect fundic gland polyps.  Normal duodenum.  Stomach antrum and body biopsies revealed proton pump inhibitor effect consistent with fundic gland polyps.  Gastric biopsies revealed polypoid foveolar hyperplasia.  Fundic gland polyp.  No evidence of H. pylori or intestinal metaplasia, dysplasia or malignancy.

## 2022-06-21 NOTE — HPI-TODAY'S VISIT:
Ms. Velazquez is a 66-year-old woman with a history of diabetes mellitus type 2, obesity, recurrent diverticulitis status post sigmoid resection (2017), presenting for follow-up after undergoing EGD. She was last seen on 9/2/2021 with multiple GI complaints including acid reflux, dysphagia and red blood per rectum.  She was recommended to undergo barium swallow, however patient preferred to proceed with endoscopic evaluation at that time.  EGD with dilation was ordered to assess for significant gastroesophageal pathology and for hopeful symptomatic relief in her dysphagia.  Given her chronic shortness of breath of unknown etiology, clearance was obtained from her cardiologist.  She was to continue Plavix prior to procedure.  Her red blood per rectum most likely related to benign anorectal disease such as hemorrhoids.  Her symptoms were noted to have resolved at the time of follow-up.  She was to continue using Tucks pads as needed for hemorrhoidal irritation.  She was instructed to retrial Benefiber 1 to 2 tablespoons daily for hopeful improvements in bowel consistency.  Her GERD symptoms were relatively well controlled on Nexium daily.  She was instructed to use Pepcid 20 mg at bedtime to maximize acid suppression.  Her interval history is notable for COVID-19 infection in January. She did not require hospitalization, supplemental oxygen therapy or ventilatory support at that time. She continues to report occasional episodes of difficulty swallowing, typically occurring with liquids. She denies any regurgitation or odynophagia. No solid food dysphagia. She denies any nausea, vomiting or nocturnal acid reflux symptoms. She has occasional mild generalized abdominal pain. She is not bothered by this stating that her pain is typically fleeting. She is having fairly regular bowel movements every other day. If she consumes dairy products, she reports diarrhea. No red blood per rectum, melena or nocturnal diarrhea. She denies any pain with defecation. Her weight remains stable. Her most recent laboratory testing available to us was performed in August 2021. This revealed mildly elevated ALT at 44 and AST of 62. Her bilirubin was normal at 0.2. She also had a normal alkaline phosphatase at 88. She tells me that she is due for labs with her primary care provider later this month and will have these forwarded to our office. She recently had an abdominal ultrasound by Dr. Reynolds which was reportedly normal. Her database is incomplete as we do not have this record for review. She tells me that her blood sugars are running higher than normal despite adhering to strict insulin regimen. She has plans to see an endocrinologist within the near future.   Interval history: 66-year-old female presents for follow-up.  She was last seen on 2/7/2022.  EGD performed in December 2021 for evaluation of dysphagia did not reveal any endoscopic abnormality to explain dysphagia.  Dysphagia persisted despite empiric dilation with 18 mm savory dilator.  Functional esophageal motility disorder was suspected.  We would consider future evaluation with esophageal manometry or barium swallow.  She was to remain on Nexium 40 mg once daily.  Regarding her prior complaints of rectal bleeding, symptoms were well controlled with daily fiber and Tucks pads daily.  She does have a history of elevated liver enzymes suspected to be due to hepatic steatosis.  We would consider further testing to rule out autoimmune, viral, and hereditary etiologies pending clinical course.  She was educated on diet, exercise, glycemic control, alcohol cessation.  She is due for repeat screening in 2024.  Labs 12/8/2021:Elevated glucose 214, normal BUN and creatinine, normal GFR, normal total bilirubin, normal ALP, normal AST, mildly elevated ALT 34, negative ASMA, negative JOSE, normal sed rate, normal CRP.  She continues to have difficulty swallowing foods and liquids. SHe has to drink water slowly. She states food gets stuck in her sternum, and she will regurgitate. She was on Reglan in the past for slow moving stomach however was quickly taken off the medication by her PCP. She will feel dizzy and short of breath. She denies odynophagia. She does not wish to perform  any further testing. She does have a chronic cough which worsens at night, She Covid in January 2021 and the cough has not resolved. She was never diagnosed with CHF. She often has to rely on her inhaler. She had a CXR a few months ago which was normal. Her heartburn is well controlled with Esomeprazole 40 mg once daily.   She did have one episode of rectal bleeding which she attributes to her hemorrhoids. Her rectum does "protrude out a little." She did have surgery for a "tear in her rectum." She does have regular bowel movements daily. She has 3-4 bowel movements daily. She denies straining. Regarding her elevated LFTs, she goes back to see her endocrinologist next week for repeat labs. He was taking two tablets of Tylenol's daily and regularly however has since decreased her intake.

## 2023-05-22 ENCOUNTER — OFFICE VISIT (OUTPATIENT)
Dept: URBAN - METROPOLITAN AREA CLINIC 113 | Facility: CLINIC | Age: 68
End: 2023-05-22
Payer: MEDICARE

## 2023-05-22 VITALS
BODY MASS INDEX: 43.59 KG/M2 | RESPIRATION RATE: 14 BRPM | WEIGHT: 246 LBS | SYSTOLIC BLOOD PRESSURE: 128 MMHG | HEIGHT: 63 IN | TEMPERATURE: 98.6 F | DIASTOLIC BLOOD PRESSURE: 85 MMHG | HEART RATE: 65 BPM

## 2023-05-22 DIAGNOSIS — R13.19 ESOPHAGEAL DYSPHAGIA: ICD-10-CM

## 2023-05-22 DIAGNOSIS — R10.13 EPIGASTRIC PAIN: ICD-10-CM

## 2023-05-22 DIAGNOSIS — K21.9 GERD WITHOUT ESOPHAGITIS: ICD-10-CM

## 2023-05-22 PROCEDURE — 99214 OFFICE O/P EST MOD 30 MIN: CPT | Performed by: INTERNAL MEDICINE

## 2023-05-22 RX ORDER — SERTRALINE HYDROCHLORIDE 112 UG/1
TAKE 1 TABLET DAILY AS DIRECTED TABLET ORAL
Refills: 0 | Status: ACTIVE | COMMUNITY

## 2023-05-22 RX ORDER — ONDANSETRON 4 MG/1
1 TABLET ON THE TONGUE AND ALLOW TO DISSOLVE TABLET, ORALLY DISINTEGRATING ORAL ONCE A DAY
Status: ACTIVE | COMMUNITY

## 2023-05-22 RX ORDER — ESOMEPRAZOLE MAGNESIUM 40 MG/1
TAKE 1 CAPSULE ONCE DAILY CAPSULE, DELAYED RELEASE PELLETS ORAL
OUTPATIENT
Start: 2017-07-10

## 2023-05-22 RX ORDER — OLMESARTAN MEDOXOMIL 40 MG/1
TAKE 1 TABLET DAILY TABLET, FILM COATED ORAL
Refills: 0 | Status: ACTIVE | COMMUNITY

## 2023-05-22 RX ORDER — DOXYCYCLINE 40 MG/1
1 CAPSULE IN THE MORNING ON AN EMPTY STOMACH CAPSULE ORAL ONCE A DAY
Status: ACTIVE | COMMUNITY

## 2023-05-22 RX ORDER — ATENOLOL 50 MG/1
TAKE 1 TABLET TWICE DAILY TABLET ORAL
Refills: 0 | Status: ACTIVE | COMMUNITY

## 2023-05-22 RX ORDER — LORAZEPAM 0.5 MG/1
TAKE 1 TABLET 3 TIMES DAILY AS NEEDED TABLET ORAL AS NEEDED
Refills: 0 | Status: ACTIVE | COMMUNITY

## 2023-05-22 RX ORDER — FAMOTIDINE 40 MG/1
1 TABLET AT BEDTIME TABLET, FILM COATED ORAL ONCE A DAY
Qty: 30 | OUTPATIENT
Start: 2023-06-07

## 2023-05-22 RX ORDER — INSULIN HUMAN 500 [IU]/ML
AS DIRECTED INJECTION, SOLUTION SUBCUTANEOUS
Status: ACTIVE | COMMUNITY

## 2023-05-22 RX ORDER — HYDROCHLOROTHIAZIDE 12.5 MG/1
1 CAPSULE IN THE MORNING CAPSULE ORAL ONCE A DAY
Status: ON HOLD | COMMUNITY

## 2023-05-22 RX ORDER — ESOMEPRAZOLE MAGNESIUM 40 MG/1
TAKE 1 CAPSULE ONCE DAILY CAPSULE, DELAYED RELEASE PELLETS ORAL
Status: ACTIVE | COMMUNITY
Start: 2017-07-10

## 2023-05-22 RX ORDER — GLUCAGON HYDROCHLORIDE 1 MG/ML
AS DIRECTED INJECTION, POWDER, FOR SOLUTION INTRAMUSCULAR; INTRAVENOUS; SUBCUTANEOUS
Status: ACTIVE | COMMUNITY

## 2023-05-22 RX ORDER — ALBUTEROL SULFATE 90 UG/1
1 PUFF AS NEEDED AEROSOL, METERED RESPIRATORY (INHALATION)
Status: ACTIVE | COMMUNITY

## 2023-05-22 RX ORDER — INSULIN DEGLUDEC INJECTION 100 U/ML
20 UTS INJECTION, SOLUTION SUBCUTANEOUS
Status: ACTIVE | COMMUNITY

## 2023-05-22 RX ORDER — ROSUVASTATIN CALCIUM 5 MG/1
1 TABLET TABLET, FILM COATED ORAL ONCE A DAY
Status: ACTIVE | COMMUNITY

## 2023-05-22 RX ORDER — NITROGLYCERIN 0.4 MG/1
AS DIRECTED TABLET SUBLINGUAL
Status: ACTIVE | COMMUNITY

## 2023-05-22 RX ORDER — HYDROXYCHLOROQUINE SULFATE 200 MG/1
TAKE ONE TABLET TABLET, FILM COATED ORAL TWICE DAILY
Status: ACTIVE | COMMUNITY

## 2023-05-22 RX ORDER — CLOPIDOGREL BISULFATE 75 MG/1
1 TABLET TABLET ORAL ONCE A DAY
Status: ACTIVE | COMMUNITY

## 2023-05-22 NOTE — HPI-TODAY'S VISIT:
Ms. Velazquez is a 67-year-old woman with a history of diabetes mellitus type 2, obesity, recurrent diverticulitis status post sigmoid resection (2017), presenting for follow up epigastric abominal pain and dysphagia.  She was last seen on 6/21/2022 for follow-up regarding esophageal dysphagia status post EGD on 12/1/2021 that was negative for any esophageal mucosal abnormality to explain the dysphagia status post empiric dilation of the esophagus with an 18 mm savory dilator, nonerosive gastropathy status post gastric biopsies showing proton pump inhibitor effect and scattered fundic gland polyps in the gastric body, normal duodenum.  Her GERD symptoms are well controlled on Nexium 40 mg daily.  Regarding her history of elevated liver enzymes secondary to hepatic steatosis, she was encouraged to continue efforts at weight loss with diet and exercise.  She returns for follow-up reporting ongoing issues with intermittent difficulties swallowing solids and liquids.  She describes the bolus of food or liquid as becoming "stuck" in her midesophagus.  She denies any heartburn or regurgitation.  She describes chest pain when she has the episodes of difficulty swallowing.  She also has been having epigastric pain that waxes and wanes and has no modifying factors.  The epigastric abdominal pain seems to be worse over the past few months.  Her bowel movements are fairly regular every morning after she drinks coffee.  She occasionally will take Linzess 145 mcg once a month.    She reports having a cardiac catheterization that was negative on 12/22/2022, lumbosacral injection for chronic back pain on 4/17/2022.

## 2023-06-07 PROBLEM — 79922009: Status: ACTIVE | Noted: 2023-06-07

## 2023-06-07 PROBLEM — 266435005: Status: ACTIVE | Noted: 2023-06-07

## 2023-06-07 PROBLEM — 40890009: Status: ACTIVE | Noted: 2023-06-07

## 2023-06-12 ENCOUNTER — LAB OUTSIDE AN ENCOUNTER (OUTPATIENT)
Dept: URBAN - METROPOLITAN AREA CLINIC 113 | Facility: CLINIC | Age: 68
End: 2023-06-12

## 2023-07-05 ENCOUNTER — TELEPHONE ENCOUNTER (OUTPATIENT)
Dept: URBAN - METROPOLITAN AREA CLINIC 113 | Facility: CLINIC | Age: 68
End: 2023-07-05

## 2023-10-30 ENCOUNTER — OFFICE VISIT (OUTPATIENT)
Dept: URBAN - METROPOLITAN AREA CLINIC 113 | Facility: CLINIC | Age: 68
End: 2023-10-30
Payer: MEDICARE

## 2023-10-30 VITALS
BODY MASS INDEX: 41.89 KG/M2 | HEIGHT: 63 IN | DIASTOLIC BLOOD PRESSURE: 82 MMHG | SYSTOLIC BLOOD PRESSURE: 146 MMHG | WEIGHT: 236.4 LBS | HEART RATE: 80 BPM | TEMPERATURE: 97.8 F

## 2023-10-30 DIAGNOSIS — K21.9 GERD WITHOUT ESOPHAGITIS: ICD-10-CM

## 2023-10-30 DIAGNOSIS — K59.04 CHRONIC IDIOPATHIC CONSTIPATION: ICD-10-CM

## 2023-10-30 DIAGNOSIS — R79.89 ELEVATED LFTS: ICD-10-CM

## 2023-10-30 PROBLEM — 82934008: Status: ACTIVE | Noted: 2023-10-30

## 2023-10-30 PROCEDURE — 99213 OFFICE O/P EST LOW 20 MIN: CPT | Performed by: INTERNAL MEDICINE

## 2023-10-30 RX ORDER — SERTRALINE HYDROCHLORIDE 112 UG/1
TAKE 1 TABLET DAILY AS DIRECTED TABLET ORAL
Refills: 0 | Status: ACTIVE | COMMUNITY

## 2023-10-30 RX ORDER — FAMOTIDINE 40 MG/1
1 TABLET AT BEDTIME TABLET, FILM COATED ORAL ONCE A DAY
Qty: 30 | Status: ON HOLD | COMMUNITY
Start: 2023-06-07

## 2023-10-30 RX ORDER — ALBUTEROL SULFATE 90 UG/1
1 PUFF AS NEEDED AEROSOL, METERED RESPIRATORY (INHALATION)
Status: ACTIVE | COMMUNITY

## 2023-10-30 RX ORDER — INSULIN HUMAN 500 [IU]/ML
AS DIRECTED INJECTION, SOLUTION SUBCUTANEOUS
Status: ACTIVE | COMMUNITY

## 2023-10-30 RX ORDER — DOXYCYCLINE 40 MG/1
1 CAPSULE IN THE MORNING ON AN EMPTY STOMACH CAPSULE ORAL ONCE A DAY
Status: ACTIVE | COMMUNITY

## 2023-10-30 RX ORDER — INSULIN DEGLUDEC INJECTION 100 U/ML
20 UTS INJECTION, SOLUTION SUBCUTANEOUS
Status: ACTIVE | COMMUNITY

## 2023-10-30 RX ORDER — ESOMEPRAZOLE MAGNESIUM 40 MG/1
TAKE 1 CAPSULE ONCE DAILY CAPSULE, DELAYED RELEASE PELLETS ORAL
Status: ACTIVE | COMMUNITY
Start: 2017-07-10

## 2023-10-30 RX ORDER — LORAZEPAM 0.5 MG/1
TAKE 1 TABLET 3 TIMES DAILY AS NEEDED TABLET ORAL AS NEEDED
Refills: 0 | Status: ACTIVE | COMMUNITY

## 2023-10-30 RX ORDER — TIRZEPATIDE 5 MG/.5ML
AS DIRECTED INJECTION, SOLUTION SUBCUTANEOUS
Status: ACTIVE | COMMUNITY

## 2023-10-30 RX ORDER — LINACLOTIDE 145 UG/1
1 CAPSULE AT LEAST 30 MINUTES BEFORE THE FIRST MEAL OF THE DAY ON AN EMPTY STOMACH CAPSULE, GELATIN COATED ORAL ONCE A DAY
Qty: 30 | OUTPATIENT
Start: 2023-10-30 | End: 2023-11-28

## 2023-10-30 RX ORDER — CLOPIDOGREL BISULFATE 75 MG/1
1 TABLET TABLET ORAL ONCE A DAY
Status: ON HOLD | COMMUNITY

## 2023-10-30 RX ORDER — OLMESARTAN MEDOXOMIL 40 MG/1
TAKE 1 TABLET DAILY TABLET, FILM COATED ORAL
Refills: 0 | Status: ACTIVE | COMMUNITY

## 2023-10-30 RX ORDER — HYDROXYCHLOROQUINE SULFATE 200 MG/1
TAKE ONE TABLET TABLET, FILM COATED ORAL TWICE DAILY
Status: ACTIVE | COMMUNITY

## 2023-10-30 RX ORDER — HYDROCHLOROTHIAZIDE 12.5 MG/1
1 CAPSULE IN THE MORNING CAPSULE ORAL ONCE A DAY
Status: ON HOLD | COMMUNITY

## 2023-10-30 RX ORDER — ROSUVASTATIN CALCIUM 5 MG/1
1 TABLET TABLET, FILM COATED ORAL ONCE A DAY
Status: ON HOLD | COMMUNITY

## 2023-10-30 RX ORDER — ONDANSETRON 4 MG/1
1 TABLET ON THE TONGUE AND ALLOW TO DISSOLVE TABLET, ORALLY DISINTEGRATING ORAL ONCE A DAY
Status: ACTIVE | COMMUNITY

## 2023-10-30 RX ORDER — EVOLOCUMAB 140 MG/ML
1 ML INJECTION, SOLUTION SUBCUTANEOUS
Status: ACTIVE | COMMUNITY

## 2023-10-30 RX ORDER — GLUCAGON HYDROCHLORIDE 1 MG/ML
AS DIRECTED INJECTION, POWDER, FOR SOLUTION INTRAMUSCULAR; INTRAVENOUS; SUBCUTANEOUS
Status: ACTIVE | COMMUNITY

## 2023-10-30 RX ORDER — ATENOLOL 50 MG/1
TAKE 1 TABLET TWICE DAILY TABLET ORAL
Refills: 0 | Status: ACTIVE | COMMUNITY

## 2023-10-30 RX ORDER — NITROGLYCERIN 0.4 MG/1
AS DIRECTED TABLET SUBLINGUAL
Status: ACTIVE | COMMUNITY

## 2023-10-30 RX ORDER — ESOMEPRAZOLE MAGNESIUM 40 MG/1
TAKE 1 CAPSULE ONCE DAILY CAPSULE, DELAYED RELEASE PELLETS ORAL
OUTPATIENT
Start: 2017-07-10

## 2023-10-30 NOTE — HPI-TODAY'S VISIT:
Ms. Velazquez is a 68-year-old woman with a history of diabetes mellitus type 2, obesity, recurrent diverticulitis status post sigmoid colon resection (2017), presenting for follow up epigastric abominal pain and dysphagia.  She was last seen in 5/22/2023 for follow-up regarding GERD, epigastric pain and esophageal dysphagia.  She was recommended to continue use omeprazole 40 mg daily and begin famotidine 40 mg before bed.  A CT abdomen pelvis with IV contrast was ordered to further evaluate the epigastric pain.The CT abdomen pelvis with contrast on 6/22/2023 showed: Diverticulosis without signs of colon diverticulitis. She reports for the past 12 weeks she has been on Mounjaro.  She she has experienced increased nausea but no vomiting.  The nausea is improved with taking Zofran.  She also has had issues with constipation described as not having a bowel movement for several days.  She has taken MiraLAX daily without any improvement.  She also has a few episodes of red blood per rectum on the tissue paper when straining to pass hard consistency stool.  Regarding GERD symptoms, she is experiencing heartburn and is discontinuing famotidine secondary to to it not being helpful.  She denies any dysphagia at this time. Labs on 9/13/2023 showed normal basic metabolic panel except glucose of 222, total bilirubin 0.3, alkaline phosphatase 74, AST 38, ALT 39, TSH 2.69; WBC 7.0, hemoglobin 15.2, MCV 91, platelets 169.

## 2023-10-30 NOTE — HPI-OTHER HISTORIES
EGD (12/1/2021): negative for any esophageal mucosal abnormality to explain the dysphagia status post empiric dilation of the esophagus with an 18 mm Savary dilator, nonerosive gastropathy status post gastric biopsies showing proton pump inhibitor effect and scattered fundic gland polyps in the gastric body, normal duodenum. Colonoscopy (12/18/2014) sigmoid colon and descending colon diverticulosis, removal of a rectosigmoid hyperplastic polyp and nonbleeding internal hemorrhoids

## 2023-11-16 PROBLEM — 863927004: Status: ACTIVE | Noted: 2023-11-16

## 2023-12-08 ENCOUNTER — TELEPHONE ENCOUNTER (OUTPATIENT)
Dept: URBAN - METROPOLITAN AREA CLINIC 107 | Facility: CLINIC | Age: 68
End: 2023-12-08

## 2023-12-08 PROBLEM — 111359004: Status: ACTIVE | Noted: 2023-12-08

## 2023-12-08 RX ORDER — AMOXICILLIN AND CLAVULANATE POTASSIUM 875; 125 MG/1; MG/1
1 TABLET TABLET, FILM COATED ORAL
Qty: 20 TABLET | Refills: 1 | OUTPATIENT
Start: 2023-12-08 | End: 2023-12-28

## 2023-12-22 ENCOUNTER — TELEPHONE ENCOUNTER (OUTPATIENT)
Dept: URBAN - METROPOLITAN AREA CLINIC 113 | Facility: CLINIC | Age: 68
End: 2023-12-22

## 2024-02-28 ENCOUNTER — OV EP (OUTPATIENT)
Dept: URBAN - METROPOLITAN AREA CLINIC 113 | Facility: CLINIC | Age: 69
End: 2024-02-28

## 2024-02-29 ENCOUNTER — OV EP (OUTPATIENT)
Dept: URBAN - METROPOLITAN AREA CLINIC 113 | Facility: CLINIC | Age: 69
End: 2024-02-29

## 2024-04-12 ENCOUNTER — OV EP (OUTPATIENT)
Dept: URBAN - METROPOLITAN AREA CLINIC 113 | Facility: CLINIC | Age: 69
End: 2024-04-12

## 2024-04-12 VITALS
HEART RATE: 68 BPM | DIASTOLIC BLOOD PRESSURE: 84 MMHG | HEIGHT: 63 IN | WEIGHT: 225 LBS | TEMPERATURE: 97.9 F | SYSTOLIC BLOOD PRESSURE: 165 MMHG | BODY MASS INDEX: 39.87 KG/M2

## 2024-04-12 RX ORDER — ATENOLOL 50 MG/1
TAKE 1 TABLET TWICE DAILY TABLET ORAL
Refills: 0 | Status: ACTIVE | COMMUNITY

## 2024-04-12 RX ORDER — DOXYCYCLINE 40 MG/1
1 CAPSULE IN THE MORNING ON AN EMPTY STOMACH CAPSULE ORAL ONCE A DAY
Status: ON HOLD | COMMUNITY

## 2024-04-12 RX ORDER — SERTRALINE HYDROCHLORIDE 112 UG/1
TAKE 1 TABLET DAILY AS DIRECTED TABLET ORAL
Refills: 0 | Status: ACTIVE | COMMUNITY

## 2024-04-12 RX ORDER — CLOPIDOGREL BISULFATE 75 MG/1
1 TABLET TABLET ORAL ONCE A DAY
Status: ON HOLD | COMMUNITY

## 2024-04-12 RX ORDER — ROSUVASTATIN CALCIUM 5 MG/1
1 TABLET TABLET, FILM COATED ORAL ONCE A DAY
Status: ON HOLD | COMMUNITY

## 2024-04-12 RX ORDER — ONDANSETRON 4 MG/1
1 TABLET ON THE TONGUE AND ALLOW TO DISSOLVE TABLET, ORALLY DISINTEGRATING ORAL ONCE A DAY
Status: ACTIVE | COMMUNITY

## 2024-04-12 RX ORDER — FAMOTIDINE 40 MG/1
1 TABLET AT BEDTIME TABLET, FILM COATED ORAL ONCE A DAY
Qty: 30 | Status: ON HOLD | COMMUNITY
Start: 2023-06-07

## 2024-04-12 RX ORDER — AMOXICILLIN AND CLAVULANATE POTASSIUM 875; 125 MG/1; MG/1
1 TABLET TABLET, FILM COATED ORAL TWICE A DAY
Qty: 20 TABLET | Refills: 0 | OUTPATIENT
Start: 2024-04-12 | End: 2024-04-22

## 2024-04-12 RX ORDER — LORAZEPAM 0.5 MG/1
TAKE 1 TABLET 3 TIMES DAILY AS NEEDED TABLET ORAL AS NEEDED
Refills: 0 | Status: ACTIVE | COMMUNITY

## 2024-04-12 RX ORDER — INSULIN HUMAN 500 [IU]/ML
AS DIRECTED INJECTION, SOLUTION SUBCUTANEOUS
Status: ACTIVE | COMMUNITY

## 2024-04-12 RX ORDER — ESOMEPRAZOLE MAGNESIUM 40 MG/1
TAKE 1 CAPSULE ONCE DAILY CAPSULE, DELAYED RELEASE PELLETS ORAL
Status: ACTIVE | COMMUNITY
Start: 2017-07-10

## 2024-04-12 RX ORDER — NITROGLYCERIN 0.4 MG/1
AS DIRECTED TABLET SUBLINGUAL
Status: ACTIVE | COMMUNITY

## 2024-04-12 RX ORDER — ALBUTEROL SULFATE 90 UG/1
1 PUFF AS NEEDED AEROSOL, METERED RESPIRATORY (INHALATION)
Status: ACTIVE | COMMUNITY

## 2024-04-12 RX ORDER — OLMESARTAN MEDOXOMIL 40 MG/1
TAKE 1 TABLET DAILY TABLET, FILM COATED ORAL
Refills: 0 | Status: ON HOLD | COMMUNITY

## 2024-04-12 RX ORDER — HYDROXYCHLOROQUINE SULFATE 200 MG/1
TAKE ONE TABLET TABLET, FILM COATED ORAL TWICE DAILY
Status: ACTIVE | COMMUNITY

## 2024-04-12 RX ORDER — GLUCAGON HYDROCHLORIDE 1 MG/ML
AS DIRECTED INJECTION, POWDER, FOR SOLUTION INTRAMUSCULAR; INTRAVENOUS; SUBCUTANEOUS
Status: ACTIVE | COMMUNITY

## 2024-04-12 RX ORDER — INSULIN DEGLUDEC INJECTION 100 U/ML
20 UTS INJECTION, SOLUTION SUBCUTANEOUS
Status: ACTIVE | COMMUNITY

## 2024-04-12 RX ORDER — HYDROCHLOROTHIAZIDE 12.5 MG/1
1 CAPSULE IN THE MORNING CAPSULE ORAL ONCE A DAY
Status: ON HOLD | COMMUNITY

## 2024-04-12 RX ORDER — TIRZEPATIDE 5 MG/.5ML
AS DIRECTED INJECTION, SOLUTION SUBCUTANEOUS
Status: ACTIVE | COMMUNITY

## 2024-04-12 RX ORDER — POLYETHYLENE GLYCOL 3350, SODIUM SULFATE ANHYDROUS, SODIUM BICARBONATE, SODIUM CHLORIDE, POTASSIUM CHLORIDE 236; 22.74; 6.74; 5.86; 2.97 G/4L; G/4L; G/4L; G/4L; G/4L
AS DIRECTED POWDER, FOR SOLUTION ORAL ONCE
Qty: 1 | Refills: 0 | OUTPATIENT
Start: 2024-04-12 | End: 2024-04-13

## 2024-04-12 RX ORDER — EVOLOCUMAB 140 MG/ML
1 ML INJECTION, SOLUTION SUBCUTANEOUS
Status: ACTIVE | COMMUNITY

## 2024-04-12 NOTE — HPI-TODAY'S VISIT:
Ms. Velazquez is a 68-year-old woman with a history of diabetes mellitus type 2, obesity, recurrent diverticulitis status post sigmoid colon resection (2017), presenting for follow up regarding recurrent colon diverticulitis.  The CT abdomen pelvis with contrast on 6/22/2023 showed: Diverticulosis without signs of colon diverticulitis.

## 2024-05-01 ENCOUNTER — DASHBOARD ENCOUNTERS (OUTPATIENT)
Age: 69
End: 2024-05-01

## 2024-08-19 ENCOUNTER — OFFICE VISIT (OUTPATIENT)
Dept: URBAN - METROPOLITAN AREA SURGERY CENTER 25 | Facility: SURGERY CENTER | Age: 69
End: 2024-08-19

## 2024-09-16 ENCOUNTER — CLAIMS CREATED FROM THE CLAIM WINDOW (OUTPATIENT)
Dept: URBAN - METROPOLITAN AREA SURGERY CENTER 25 | Facility: SURGERY CENTER | Age: 69
End: 2024-09-16
Payer: MEDICARE

## 2024-09-16 ENCOUNTER — CLAIMS CREATED FROM THE CLAIM WINDOW (OUTPATIENT)
Dept: URBAN - METROPOLITAN AREA CLINIC 4 | Facility: CLINIC | Age: 69
End: 2024-09-16
Payer: MEDICARE

## 2024-09-16 DIAGNOSIS — Z98.0 INTESTINAL BYPASS AND ANASTOMOSIS STATUS: ICD-10-CM

## 2024-09-16 DIAGNOSIS — K57.32 DIVERTICULITIS OF LARGE INTESTINE WITHOUT PERFORATION OR ABSCESS WITHOUT BLEEDING: ICD-10-CM

## 2024-09-16 DIAGNOSIS — D12.2 ADENOMA OF ASCENDING COLON: ICD-10-CM

## 2024-09-16 DIAGNOSIS — K63.5 BENIGN COLON POLYPS: ICD-10-CM

## 2024-09-16 DIAGNOSIS — K64.1 SECOND DEGREE HEMORRHOIDS: ICD-10-CM

## 2024-09-16 DIAGNOSIS — K57.32 DIVERTICULITIS LARGE INTESTINE W/O PERFORATION OR ABSCESS W/O BLEEDING: ICD-10-CM

## 2024-09-16 DIAGNOSIS — K57.30 COLON, DIVERTICULOSIS: ICD-10-CM

## 2024-09-16 DIAGNOSIS — D12.2 BENIGN NEOPLASM OF ASCENDING COLON: ICD-10-CM

## 2024-09-16 PROCEDURE — 45385 COLONOSCOPY W/LESION REMOVAL: CPT | Performed by: INTERNAL MEDICINE

## 2024-09-16 PROCEDURE — 00811 ANES LWR INTST NDSC NOS: CPT | Performed by: NURSE ANESTHETIST, CERTIFIED REGISTERED

## 2024-09-16 PROCEDURE — 00811 ANES LWR INTST NDSC NOS: CPT | Performed by: ANESTHESIOLOGY

## 2024-09-16 PROCEDURE — 88305 TISSUE EXAM BY PATHOLOGIST: CPT | Performed by: PATHOLOGY

## 2024-09-16 RX ORDER — INSULIN HUMAN 500 [IU]/ML
AS DIRECTED INJECTION, SOLUTION SUBCUTANEOUS
Status: ACTIVE | COMMUNITY

## 2024-09-16 RX ORDER — HYDROCHLOROTHIAZIDE 12.5 MG/1
1 CAPSULE IN THE MORNING CAPSULE ORAL ONCE A DAY
Status: ON HOLD | COMMUNITY

## 2024-09-16 RX ORDER — NITROGLYCERIN 0.4 MG/1
AS DIRECTED TABLET SUBLINGUAL
Status: ACTIVE | COMMUNITY

## 2024-09-16 RX ORDER — ROSUVASTATIN 5 MG/1
1 TABLET TABLET, FILM COATED ORAL ONCE A DAY
Status: ON HOLD | COMMUNITY

## 2024-09-16 RX ORDER — TIRZEPATIDE 5 MG/.5ML
AS DIRECTED INJECTION, SOLUTION SUBCUTANEOUS
Status: ACTIVE | COMMUNITY

## 2024-09-16 RX ORDER — HYDROXYCHLOROQUINE SULFATE 200 MG/1
TAKE ONE TABLET TABLET, FILM COATED ORAL TWICE DAILY
Status: ACTIVE | COMMUNITY

## 2024-09-16 RX ORDER — POLYETHYLENE GLYCOL 3350 17 G/17G
1 SCOOP MIXED WITH 8 OUNCES OF FLUID POWDER, FOR SOLUTION ORAL ONCE A DAY
Status: ACTIVE | COMMUNITY

## 2024-09-16 RX ORDER — INSULIN DEGLUDEC INJECTION 100 U/ML
20 UTS INJECTION, SOLUTION SUBCUTANEOUS
Status: ACTIVE | COMMUNITY

## 2024-09-16 RX ORDER — ALBUTEROL SULFATE 90 UG/1
1 PUFF AS NEEDED AEROSOL, METERED RESPIRATORY (INHALATION)
Status: ACTIVE | COMMUNITY

## 2024-09-16 RX ORDER — LINACLOTIDE 145 UG/1
1 CAPSULE AT LEAST 30 MINUTES BEFORE THE FIRST MEAL OF THE DAY ON AN EMPTY STOMACH CAPSULE, GELATIN COATED ORAL ONCE A DAY
Status: ACTIVE | COMMUNITY

## 2024-09-16 RX ORDER — FAMOTIDINE 40 MG/1
1 TABLET AT BEDTIME TABLET, FILM COATED ORAL ONCE A DAY
Status: ACTIVE | COMMUNITY
Start: 2023-06-07

## 2024-09-16 RX ORDER — ESOMEPRAZOLE MAGNESIUM 40 MG/1
TAKE 1 CAPSULE ONCE DAILY CAPSULE, DELAYED RELEASE PELLETS ORAL
Status: ACTIVE | COMMUNITY
Start: 2017-07-10

## 2024-09-16 RX ORDER — DOXYCYCLINE 40 MG/1
1 CAPSULE IN THE MORNING ON AN EMPTY STOMACH CAPSULE ORAL ONCE A DAY
Status: ON HOLD | COMMUNITY

## 2024-09-16 RX ORDER — LORAZEPAM 0.5 MG/1
TAKE 1 TABLET 3 TIMES DAILY AS NEEDED TABLET ORAL AS NEEDED
Refills: 0 | Status: ACTIVE | COMMUNITY

## 2024-09-16 RX ORDER — SERTRALINE HYDROCHLORIDE 112 UG/1
TAKE 1 TABLET DAILY AS DIRECTED TABLET ORAL
Refills: 0 | Status: ACTIVE | COMMUNITY

## 2024-09-16 RX ORDER — CLOPIDOGREL BISULFATE 75 MG/1
1 TABLET TABLET ORAL ONCE A DAY
Status: ON HOLD | COMMUNITY

## 2024-09-16 RX ORDER — OLMESARTAN MEDOXOMIL 40 MG/1
TAKE 1 TABLET DAILY TABLET, FILM COATED ORAL
Refills: 0 | Status: ON HOLD | COMMUNITY

## 2024-09-16 RX ORDER — ONDANSETRON 4 MG/1
1 TABLET ON THE TONGUE AND ALLOW TO DISSOLVE TABLET, ORALLY DISINTEGRATING ORAL ONCE A DAY
Status: ACTIVE | COMMUNITY

## 2024-09-16 RX ORDER — ATENOLOL 50 MG/1
TAKE 1 TABLET TWICE DAILY TABLET ORAL
Refills: 0 | Status: ACTIVE | COMMUNITY

## 2024-09-16 RX ORDER — EVOLOCUMAB 140 MG/ML
1 ML INJECTION, SOLUTION SUBCUTANEOUS
Status: ACTIVE | COMMUNITY

## 2024-09-16 RX ORDER — GLUCAGON HYDROCHLORIDE 1 MG/ML
AS DIRECTED INJECTION, POWDER, FOR SOLUTION INTRAMUSCULAR; INTRAVENOUS; SUBCUTANEOUS
Status: ACTIVE | COMMUNITY

## 2024-10-08 ENCOUNTER — OFFICE VISIT (OUTPATIENT)
Dept: URBAN - METROPOLITAN AREA CLINIC 113 | Facility: CLINIC | Age: 69
End: 2024-10-08

## 2024-11-08 ENCOUNTER — ERX REFILL RESPONSE (OUTPATIENT)
Dept: URBAN - METROPOLITAN AREA CLINIC 107 | Facility: CLINIC | Age: 69
End: 2024-11-08

## 2024-11-08 RX ORDER — AMOXICILLIN AND CLAVULANATE POTASSIUM 875; 125 MG/1; MG/1
TAKE ONE (1) TABLET BY MOUTH EVERY 12 HOURS WITH FOOD FOR 10 DAYS TABLET, FILM COATED ORAL
Qty: 20 TABLET | Refills: 2 | OUTPATIENT

## 2024-11-08 RX ORDER — AMOXICILLIN AND CLAVULANATE POTASSIUM 875; 125 MG/1; MG/1
TAKE ONE (1) TABLET BY MOUTH EVERY 12 HOURS WITH FOOD FOR 10 DAYS TABLET, FILM COATED ORAL
Qty: 20 TABLET | Refills: 1 | OUTPATIENT

## 2025-01-17 ENCOUNTER — OFFICE VISIT (OUTPATIENT)
Dept: URBAN - METROPOLITAN AREA CLINIC 113 | Facility: CLINIC | Age: 70
End: 2025-01-17
Payer: COMMERCIAL

## 2025-01-17 VITALS
WEIGHT: 213 LBS | DIASTOLIC BLOOD PRESSURE: 92 MMHG | HEIGHT: 63 IN | SYSTOLIC BLOOD PRESSURE: 156 MMHG | RESPIRATION RATE: 20 BRPM | BODY MASS INDEX: 37.74 KG/M2 | HEART RATE: 67 BPM | TEMPERATURE: 97.5 F

## 2025-01-17 DIAGNOSIS — Z87.19 HISTORY OF COLONIC DIVERTICULITIS: ICD-10-CM

## 2025-01-17 DIAGNOSIS — K59.04 CHRONIC IDIOPATHIC CONSTIPATION: ICD-10-CM

## 2025-01-17 DIAGNOSIS — K21.9 GERD WITHOUT ESOPHAGITIS: ICD-10-CM

## 2025-01-17 PROCEDURE — 99213 OFFICE O/P EST LOW 20 MIN: CPT

## 2025-01-17 RX ORDER — ESOMEPRAZOLE MAGNESIUM 40 MG/1
TAKE 1 CAPSULE ONCE DAILY CAPSULE, DELAYED RELEASE PELLETS ORAL
Status: ACTIVE | COMMUNITY
Start: 2017-07-10

## 2025-01-17 RX ORDER — SERTRALINE HYDROCHLORIDE 112 UG/1
TAKE 1 TABLET DAILY AS DIRECTED TABLET ORAL
Refills: 0 | Status: ACTIVE | COMMUNITY

## 2025-01-17 RX ORDER — FAMOTIDINE 40 MG/1
1 TABLET AT BEDTIME TABLET, FILM COATED ORAL ONCE A DAY
Status: ACTIVE | COMMUNITY
Start: 2023-06-07

## 2025-01-17 RX ORDER — LINACLOTIDE 145 UG/1
1 CAPSULE AT LEAST 30 MINUTES BEFORE THE FIRST MEAL OF THE DAY ON AN EMPTY STOMACH CAPSULE, GELATIN COATED ORAL ONCE A DAY
Status: ACTIVE | COMMUNITY

## 2025-01-17 RX ORDER — ALBUTEROL SULFATE 90 UG/1
1 PUFF AS NEEDED AEROSOL, METERED RESPIRATORY (INHALATION)
Status: ACTIVE | COMMUNITY

## 2025-01-17 RX ORDER — INSULIN HUMAN 500 [IU]/ML
AS DIRECTED INJECTION, SOLUTION SUBCUTANEOUS
Status: ACTIVE | COMMUNITY

## 2025-01-17 RX ORDER — EVOLOCUMAB 140 MG/ML
1 ML INJECTION, SOLUTION SUBCUTANEOUS
Status: ACTIVE | COMMUNITY

## 2025-01-17 RX ORDER — FAMOTIDINE 40 MG/1
1 TABLET AT BEDTIME TABLET, FILM COATED ORAL ONCE A DAY
OUTPATIENT

## 2025-01-17 RX ORDER — POLYETHYLENE GLYCOL 3350 17 G/DOSE
1 SCOOP MIXED WITH 8 OUNCES OF FLUID POWDER (GRAM) ORAL ONCE A DAY
Status: ACTIVE | COMMUNITY

## 2025-01-17 RX ORDER — ATENOLOL 50 MG/1
TAKE 1 TABLET TWICE DAILY TABLET ORAL
Refills: 0 | Status: ACTIVE | COMMUNITY

## 2025-01-17 RX ORDER — NITROGLYCERIN 0.4 MG/1
AS DIRECTED TABLET SUBLINGUAL
Status: ACTIVE | COMMUNITY

## 2025-01-17 RX ORDER — TIRZEPATIDE 5 MG/.5ML
AS DIRECTED INJECTION, SOLUTION SUBCUTANEOUS
Status: ACTIVE | COMMUNITY

## 2025-01-17 RX ORDER — INSULIN DEGLUDEC INJECTION 100 U/ML
20 UTS INJECTION, SOLUTION SUBCUTANEOUS
Status: ACTIVE | COMMUNITY

## 2025-01-17 RX ORDER — ESOMEPRAZOLE MAGNESIUM 40 MG/1
TAKE 1 CAPSULE ONCE DAILY CAPSULE, DELAYED RELEASE PELLETS ORAL
OUTPATIENT

## 2025-01-17 RX ORDER — LORAZEPAM 0.5 MG/1
TAKE 1 TABLET 3 TIMES DAILY AS NEEDED TABLET ORAL AS NEEDED
Refills: 0 | Status: ACTIVE | COMMUNITY

## 2025-01-17 RX ORDER — LINACLOTIDE 145 UG/1
1 CAPSULE AT LEAST 30 MINUTES BEFORE THE FIRST MEAL OF THE DAY ON AN EMPTY STOMACH CAPSULE, GELATIN COATED ORAL ONCE A DAY
OUTPATIENT

## 2025-01-17 RX ORDER — HYDROXYCHLOROQUINE SULFATE 200 MG/1
TAKE ONE TABLET TABLET, FILM COATED ORAL TWICE DAILY
Status: ACTIVE | COMMUNITY

## 2025-01-17 RX ORDER — POLYETHYLENE GLYCOL 3350 17 G/DOSE
1 SCOOP MIXED WITH 8 OUNCES OF FLUID POWDER (GRAM) ORAL ONCE A DAY
OUTPATIENT

## 2025-01-17 RX ORDER — ONDANSETRON 4 MG/1
1 TABLET ON THE TONGUE AND ALLOW TO DISSOLVE TABLET, ORALLY DISINTEGRATING ORAL ONCE A DAY
Status: ACTIVE | COMMUNITY

## 2025-01-17 RX ORDER — GLUCAGON HYDROCHLORIDE 1 MG/ML
AS DIRECTED INJECTION, POWDER, FOR SOLUTION INTRAMUSCULAR; INTRAVENOUS; SUBCUTANEOUS
Status: ACTIVE | COMMUNITY

## 2025-01-17 RX ORDER — AMOXICILLIN AND CLAVULANATE POTASSIUM 875; 125 MG/1; MG/1
TAKE ONE (1) TABLET BY MOUTH EVERY 12 HOURS WITH FOOD FOR 10 DAYS TABLET, FILM COATED ORAL
Qty: 20 TABLET | Refills: 1 | Status: ACTIVE | COMMUNITY

## 2025-01-17 NOTE — HPI-TODAY'S VISIT:
Ms. Velazquez is a 69-year-old woman with a history of diabetes type 2, obesity, recurrent diverticulitis status post sigmoid colon resection (2000 and) presenting for follow-up after colonoscopy.  She was last seen in office on 4/12/2024 for follow-up regarding recurrent bouts of uncomplicated sigmoid diverticulitis.  She had a colonoscopy in 2014 which was notable for left colon diverticulosis underwent a sigmoid colon resection for recurrent diverticuliti. A Colonoscopy was discussed to exclude any underlying pathology that precipitated the recurrent bout of diverticulitis.  She was given a prescription for Augmentin in the event of recurrent bout.  Chronic idiopathic constipation fairly well-controlled on MiraLAX 1 capful daily and Linzess 145 mcg daily as needed.  GERD symptoms well-controlled on as omeprazole 40 mg every morning and famotidine before bed.   Her colonoscopy performed 9/16/2024 with adequate bowel prep showed external hemorrhoids).  2 small (4-6 mm) polyps in the ascending colon.  Diverticulosis in the ascending colon, descending colon, and sigmoid colon.  Patent end-to-end colocolonic anastomosis, characterized by healthy-appearing mucosa (18 m from anal verge).  Nonbleeding, hemorrhage, grade 2.  Pathology revealed tubular adenoma and a sessile serrated adenoma.  Colonoscopy is recommended in 7 years for surveillance.  Today she reports she has not needed her Augmentin. She did feel some lower abdominal pain, around December, it has improved. She has had some hematuria and is under evaluation with urology. Denies fever, nausea, vomiting, or weight loss. Her bowels move overall regularly with Linzess 145 mcg. Denies hematochezia or melena.